# Patient Record
Sex: FEMALE | Race: WHITE | Employment: OTHER | URBAN - METROPOLITAN AREA
[De-identification: names, ages, dates, MRNs, and addresses within clinical notes are randomized per-mention and may not be internally consistent; named-entity substitution may affect disease eponyms.]

---

## 2019-08-26 ENCOUNTER — HOSPITAL ENCOUNTER (OUTPATIENT)
Dept: MRI IMAGING | Age: 84
Discharge: HOME | End: 2019-08-26
Payer: MEDICARE

## 2019-08-26 DIAGNOSIS — M54.59 MECHANICAL LOW BACK PAIN: ICD-10-CM

## 2019-08-26 PROCEDURE — 72148 MRI LUMBAR SPINE W/O DYE: CPT

## 2019-11-05 ENCOUNTER — APPOINTMENT (OUTPATIENT)
Dept: GENERAL RADIOLOGY | Age: 84
DRG: 247 | End: 2019-11-05
Payer: MEDICARE

## 2019-11-05 ENCOUNTER — HOSPITAL ENCOUNTER (INPATIENT)
Age: 84
LOS: 3 days | Discharge: HOME | DRG: 247 | End: 2019-11-08
Attending: EMERGENCY MEDICINE | Admitting: INTERNAL MEDICINE
Payer: MEDICARE

## 2019-11-05 DIAGNOSIS — I24.9 ACS (ACUTE CORONARY SYNDROME) (HCC): Primary | ICD-10-CM

## 2019-11-05 DIAGNOSIS — I21.3 ST ELEVATION MYOCARDIAL INFARCTION (STEMI), UNSPECIFIED ARTERY (HCC): ICD-10-CM

## 2019-11-05 PROBLEM — I21.11 ST ELEVATION MYOCARDIAL INFARCTION INVOLVING RIGHT CORONARY ARTERY (HCC): Status: ACTIVE | Noted: 2019-11-05

## 2019-11-05 PROBLEM — I21.11 STEMI INVOLVING RIGHT CORONARY ARTERY (HCC): Status: ACTIVE | Noted: 2019-11-05

## 2019-11-05 LAB
ANION GAP SERPL CALCULATED.3IONS-SCNC: 16 MMOL/L (ref 3–16)
BASOPHILS ABSOLUTE: 0.1 K/UL (ref 0–0.2)
BASOPHILS RELATIVE PERCENT: 0.3 %
BUN BLDV-MCNC: 15 MG/DL (ref 7–20)
CALCIUM SERPL-MCNC: 9.4 MG/DL (ref 8.3–10.6)
CHLORIDE BLD-SCNC: 98 MMOL/L (ref 99–110)
CO2: 23 MMOL/L (ref 21–32)
CREAT SERPL-MCNC: 0.8 MG/DL (ref 0.6–1.2)
EOSINOPHILS ABSOLUTE: 0 K/UL (ref 0–0.6)
EOSINOPHILS RELATIVE PERCENT: 0.1 %
GFR AFRICAN AMERICAN: >60
GFR NON-AFRICAN AMERICAN: >60
GLUCOSE BLD-MCNC: 142 MG/DL (ref 70–99)
HCT VFR BLD CALC: 42.2 % (ref 36–48)
HEMOGLOBIN: 14.1 G/DL (ref 12–16)
LEFT VENTRICULAR EJECTION FRACTION HIGH VALUE: 50 %
LEFT VENTRICULAR EJECTION FRACTION MODE: NORMAL
LEFT VENTRICULAR EJECTION FRACTION MODE: NORMAL
LV EF: 45 %
LV EF: 48 %
LV EF: 55 %
LVEF MODALITY: NORMAL
LYMPHOCYTES ABSOLUTE: 1.1 K/UL (ref 1–5.1)
LYMPHOCYTES RELATIVE PERCENT: 7.3 %
MCH RBC QN AUTO: 31 PG (ref 26–34)
MCHC RBC AUTO-ENTMCNC: 33.3 G/DL (ref 31–36)
MCV RBC AUTO: 92.9 FL (ref 80–100)
MONOCYTES ABSOLUTE: 1.1 K/UL (ref 0–1.3)
MONOCYTES RELATIVE PERCENT: 7.3 %
NEUTROPHILS ABSOLUTE: 13.1 K/UL (ref 1.7–7.7)
NEUTROPHILS RELATIVE PERCENT: 85 %
PDW BLD-RTO: 15.7 % (ref 12.4–15.4)
PLATELET # BLD: 281 K/UL (ref 135–450)
PMV BLD AUTO: 8.4 FL (ref 5–10.5)
POC ACT LR: 195 SEC
POC ACT LR: 222 SEC
POC ACT LR: 241 SEC
POC ACT LR: 247 SEC
POTASSIUM SERPL-SCNC: 3.8 MMOL/L (ref 3.5–5.1)
RBC # BLD: 4.54 M/UL (ref 4–5.2)
SODIUM BLD-SCNC: 137 MMOL/L (ref 136–145)
TROPONIN: 1.05 NG/ML
TROPONIN: 2.71 NG/ML
WBC # BLD: 15.4 K/UL (ref 4–11)

## 2019-11-05 PROCEDURE — 85347 COAGULATION TIME ACTIVATED: CPT

## 2019-11-05 PROCEDURE — 93005 ELECTROCARDIOGRAM TRACING: CPT | Performed by: INTERNAL MEDICINE

## 2019-11-05 PROCEDURE — 99223 1ST HOSP IP/OBS HIGH 75: CPT | Performed by: INTERNAL MEDICINE

## 2019-11-05 PROCEDURE — 6360000002 HC RX W HCPCS

## 2019-11-05 PROCEDURE — 93458 L HRT ARTERY/VENTRICLE ANGIO: CPT

## 2019-11-05 PROCEDURE — 6370000000 HC RX 637 (ALT 250 FOR IP): Performed by: INTERNAL MEDICINE

## 2019-11-05 PROCEDURE — B2151ZZ FLUOROSCOPY OF LEFT HEART USING LOW OSMOLAR CONTRAST: ICD-10-PCS | Performed by: INTERNAL MEDICINE

## 2019-11-05 PROCEDURE — 2709999900 HC NON-CHARGEABLE SUPPLY

## 2019-11-05 PROCEDURE — 6360000004 HC RX CONTRAST MEDICATION: Performed by: INTERNAL MEDICINE

## 2019-11-05 PROCEDURE — 027035Z DILATION OF CORONARY ARTERY, ONE ARTERY WITH TWO DRUG-ELUTING INTRALUMINAL DEVICES, PERCUTANEOUS APPROACH: ICD-10-PCS | Performed by: INTERNAL MEDICINE

## 2019-11-05 PROCEDURE — 93306 TTE W/DOPPLER COMPLETE: CPT

## 2019-11-05 PROCEDURE — C1725 CATH, TRANSLUMIN NON-LASER: HCPCS

## 2019-11-05 PROCEDURE — 99153 MOD SED SAME PHYS/QHP EA: CPT

## 2019-11-05 PROCEDURE — 6370000000 HC RX 637 (ALT 250 FOR IP): Performed by: NURSE PRACTITIONER

## 2019-11-05 PROCEDURE — 93005 ELECTROCARDIOGRAM TRACING: CPT | Performed by: EMERGENCY MEDICINE

## 2019-11-05 PROCEDURE — C1769 GUIDE WIRE: HCPCS

## 2019-11-05 PROCEDURE — 80048 BASIC METABOLIC PNL TOTAL CA: CPT

## 2019-11-05 PROCEDURE — C1894 INTRO/SHEATH, NON-LASER: HCPCS

## 2019-11-05 PROCEDURE — 71045 X-RAY EXAM CHEST 1 VIEW: CPT

## 2019-11-05 PROCEDURE — C9606 PERC D-E COR REVASC W AMI S: HCPCS

## 2019-11-05 PROCEDURE — 99152 MOD SED SAME PHYS/QHP 5/>YRS: CPT

## 2019-11-05 PROCEDURE — 99285 EMERGENCY DEPT VISIT HI MDM: CPT

## 2019-11-05 PROCEDURE — 84484 ASSAY OF TROPONIN QUANT: CPT

## 2019-11-05 PROCEDURE — B2111ZZ FLUOROSCOPY OF MULTIPLE CORONARY ARTERIES USING LOW OSMOLAR CONTRAST: ICD-10-PCS | Performed by: INTERNAL MEDICINE

## 2019-11-05 PROCEDURE — C1874 STENT, COATED/COV W/DEL SYS: HCPCS

## 2019-11-05 PROCEDURE — 94760 N-INVAS EAR/PLS OXIMETRY 1: CPT

## 2019-11-05 PROCEDURE — 2580000003 HC RX 258: Performed by: INTERNAL MEDICINE

## 2019-11-05 PROCEDURE — 6370000000 HC RX 637 (ALT 250 FOR IP)

## 2019-11-05 PROCEDURE — 2500000003 HC RX 250 WO HCPCS

## 2019-11-05 PROCEDURE — 2000000000 HC ICU R&B

## 2019-11-05 PROCEDURE — 85025 COMPLETE CBC W/AUTO DIFF WBC: CPT

## 2019-11-05 PROCEDURE — 4A023N7 MEASUREMENT OF CARDIAC SAMPLING AND PRESSURE, LEFT HEART, PERCUTANEOUS APPROACH: ICD-10-PCS | Performed by: INTERNAL MEDICINE

## 2019-11-05 RX ORDER — OXYCODONE HYDROCHLORIDE AND ACETAMINOPHEN 5; 325 MG/1; MG/1
1 TABLET ORAL EVERY 4 HOURS PRN
Status: DISCONTINUED | OUTPATIENT
Start: 2019-11-05 | End: 2019-11-08 | Stop reason: HOSPADM

## 2019-11-05 RX ORDER — ONDANSETRON 2 MG/ML
4 INJECTION INTRAMUSCULAR; INTRAVENOUS EVERY 6 HOURS PRN
Status: DISCONTINUED | OUTPATIENT
Start: 2019-11-05 | End: 2019-11-08 | Stop reason: HOSPADM

## 2019-11-05 RX ORDER — ACETAMINOPHEN 325 MG/1
650 TABLET ORAL EVERY 4 HOURS PRN
Status: DISCONTINUED | OUTPATIENT
Start: 2019-11-05 | End: 2019-11-08 | Stop reason: HOSPADM

## 2019-11-05 RX ORDER — FLUTICASONE PROPIONATE 50 MCG
1 SPRAY, SUSPENSION (ML) NASAL DAILY
Status: ON HOLD | COMMUNITY
End: 2019-11-08 | Stop reason: HOSPADM

## 2019-11-05 RX ORDER — FEXOFENADINE HYDROCHLORIDE 60 MG/1
60 TABLET, FILM COATED ORAL DAILY
COMMUNITY

## 2019-11-05 RX ORDER — ASPIRIN 81 MG/1
324 TABLET, CHEWABLE ORAL ONCE
Status: COMPLETED | OUTPATIENT
Start: 2019-11-05 | End: 2019-11-05

## 2019-11-05 RX ORDER — SODIUM CHLORIDE 0.9 % (FLUSH) 0.9 %
10 SYRINGE (ML) INJECTION EVERY 12 HOURS SCHEDULED
Status: DISCONTINUED | OUTPATIENT
Start: 2019-11-05 | End: 2019-11-08 | Stop reason: HOSPADM

## 2019-11-05 RX ORDER — SODIUM CHLORIDE 0.9 % (FLUSH) 0.9 %
10 SYRINGE (ML) INJECTION PRN
Status: DISCONTINUED | OUTPATIENT
Start: 2019-11-05 | End: 2019-11-08 | Stop reason: HOSPADM

## 2019-11-05 RX ORDER — ASPIRIN 81 MG/1
81 TABLET ORAL DAILY
Status: DISCONTINUED | OUTPATIENT
Start: 2019-11-06 | End: 2019-11-08 | Stop reason: HOSPADM

## 2019-11-05 RX ORDER — ROSUVASTATIN CALCIUM 20 MG/1
20 TABLET, COATED ORAL NIGHTLY
Status: DISCONTINUED | OUTPATIENT
Start: 2019-11-05 | End: 2019-11-08 | Stop reason: HOSPADM

## 2019-11-05 RX ORDER — SODIUM CHLORIDE 9 MG/ML
INJECTION, SOLUTION INTRAVENOUS CONTINUOUS
Status: DISCONTINUED | OUTPATIENT
Start: 2019-11-05 | End: 2019-11-06

## 2019-11-05 RX ORDER — MORPHINE SULFATE 2 MG/ML
2 INJECTION, SOLUTION INTRAMUSCULAR; INTRAVENOUS
Status: DISCONTINUED | OUTPATIENT
Start: 2019-11-05 | End: 2019-11-08 | Stop reason: HOSPADM

## 2019-11-05 RX ORDER — LEVOTHYROXINE SODIUM 0.07 MG/1
75 TABLET ORAL DAILY
COMMUNITY

## 2019-11-05 RX ORDER — CLOPIDOGREL BISULFATE 75 MG/1
75 TABLET ORAL DAILY
Status: DISCONTINUED | OUTPATIENT
Start: 2019-11-06 | End: 2019-11-08 | Stop reason: HOSPADM

## 2019-11-05 RX ORDER — OXYCODONE HYDROCHLORIDE AND ACETAMINOPHEN 5; 325 MG/1; MG/1
2 TABLET ORAL EVERY 4 HOURS PRN
Status: DISCONTINUED | OUTPATIENT
Start: 2019-11-05 | End: 2019-11-08 | Stop reason: HOSPADM

## 2019-11-05 RX ADMIN — METOPROLOL TARTRATE 25 MG: 25 TABLET, FILM COATED ORAL at 13:51

## 2019-11-05 RX ADMIN — SODIUM CHLORIDE: 9 INJECTION, SOLUTION INTRAVENOUS at 13:51

## 2019-11-05 RX ADMIN — ACETAMINOPHEN 650 MG: 325 TABLET, FILM COATED ORAL at 17:17

## 2019-11-05 RX ADMIN — ROSUVASTATIN CALCIUM 20 MG: 20 TABLET, FILM COATED ORAL at 21:04

## 2019-11-05 RX ADMIN — IOPAMIDOL 105 ML: 755 INJECTION, SOLUTION INTRAVENOUS at 13:16

## 2019-11-05 RX ADMIN — ASPIRIN 81 MG 243 MG: 81 TABLET ORAL at 11:36

## 2019-11-05 ASSESSMENT — ENCOUNTER SYMPTOMS
ABDOMINAL PAIN: 0
SHORTNESS OF BREATH: 1
NAUSEA: 0

## 2019-11-05 ASSESSMENT — PAIN SCALES - GENERAL
PAINLEVEL_OUTOF10: 3
PAINLEVEL_OUTOF10: 10
PAINLEVEL_OUTOF10: 3
PAINLEVEL_OUTOF10: 0

## 2019-11-05 ASSESSMENT — PAIN DESCRIPTION - PAIN TYPE
TYPE: ACUTE PAIN
TYPE: ACUTE PAIN

## 2019-11-05 ASSESSMENT — PAIN DESCRIPTION - LOCATION
LOCATION: CHEST
LOCATION: CHEST

## 2019-11-06 LAB
ANION GAP SERPL CALCULATED.3IONS-SCNC: 12 MMOL/L (ref 3–16)
BUN BLDV-MCNC: 17 MG/DL (ref 7–20)
CALCIUM SERPL-MCNC: 8.4 MG/DL (ref 8.3–10.6)
CHLORIDE BLD-SCNC: 105 MMOL/L (ref 99–110)
CHOLESTEROL, TOTAL: 152 MG/DL (ref 0–199)
CO2: 23 MMOL/L (ref 21–32)
CREAT SERPL-MCNC: 0.7 MG/DL (ref 0.6–1.2)
EKG ATRIAL RATE: 101 BPM
EKG ATRIAL RATE: 80 BPM
EKG DIAGNOSIS: NORMAL
EKG DIAGNOSIS: NORMAL
EKG P AXIS: 57 DEGREES
EKG P AXIS: 70 DEGREES
EKG P-R INTERVAL: 124 MS
EKG P-R INTERVAL: 128 MS
EKG Q-T INTERVAL: 342 MS
EKG Q-T INTERVAL: 392 MS
EKG QRS DURATION: 86 MS
EKG QRS DURATION: 90 MS
EKG QTC CALCULATION (BAZETT): 443 MS
EKG QTC CALCULATION (BAZETT): 452 MS
EKG R AXIS: -30 DEGREES
EKG R AXIS: -42 DEGREES
EKG T AXIS: -34 DEGREES
EKG T AXIS: 7 DEGREES
EKG VENTRICULAR RATE: 101 BPM
EKG VENTRICULAR RATE: 80 BPM
GFR AFRICAN AMERICAN: >60
GFR NON-AFRICAN AMERICAN: >60
GLUCOSE BLD-MCNC: 146 MG/DL (ref 70–99)
HCT VFR BLD CALC: 36.3 % (ref 36–48)
HDLC SERPL-MCNC: 67 MG/DL (ref 40–60)
HEMOGLOBIN: 12.1 G/DL (ref 12–16)
LDL CHOLESTEROL CALCULATED: 71 MG/DL
MCH RBC QN AUTO: 31.2 PG (ref 26–34)
MCHC RBC AUTO-ENTMCNC: 33.2 G/DL (ref 31–36)
MCV RBC AUTO: 93.9 FL (ref 80–100)
PDW BLD-RTO: 15.5 % (ref 12.4–15.4)
PLATELET # BLD: 222 K/UL (ref 135–450)
PMV BLD AUTO: 8.6 FL (ref 5–10.5)
POTASSIUM SERPL-SCNC: 3.3 MMOL/L (ref 3.5–5.1)
RBC # BLD: 3.87 M/UL (ref 4–5.2)
SODIUM BLD-SCNC: 140 MMOL/L (ref 136–145)
TRIGL SERPL-MCNC: 68 MG/DL (ref 0–150)
VLDLC SERPL CALC-MCNC: 14 MG/DL
WBC # BLD: 10.3 K/UL (ref 4–11)

## 2019-11-06 PROCEDURE — 80061 LIPID PANEL: CPT

## 2019-11-06 PROCEDURE — 94760 N-INVAS EAR/PLS OXIMETRY 1: CPT

## 2019-11-06 PROCEDURE — 6370000000 HC RX 637 (ALT 250 FOR IP): Performed by: INTERNAL MEDICINE

## 2019-11-06 PROCEDURE — 85027 COMPLETE CBC AUTOMATED: CPT

## 2019-11-06 PROCEDURE — 93010 ELECTROCARDIOGRAM REPORT: CPT | Performed by: INTERNAL MEDICINE

## 2019-11-06 PROCEDURE — 80048 BASIC METABOLIC PNL TOTAL CA: CPT

## 2019-11-06 PROCEDURE — 99291 CRITICAL CARE FIRST HOUR: CPT | Performed by: INTERNAL MEDICINE

## 2019-11-06 PROCEDURE — 2580000003 HC RX 258: Performed by: INTERNAL MEDICINE

## 2019-11-06 PROCEDURE — 2000000000 HC ICU R&B

## 2019-11-06 RX ORDER — POTASSIUM CHLORIDE 20 MEQ/1
40 TABLET, EXTENDED RELEASE ORAL PRN
Status: DISCONTINUED | OUTPATIENT
Start: 2019-11-06 | End: 2019-11-08 | Stop reason: HOSPADM

## 2019-11-06 RX ORDER — METOPROLOL SUCCINATE 25 MG/1
12.5 TABLET, EXTENDED RELEASE ORAL DAILY
Status: DISCONTINUED | OUTPATIENT
Start: 2019-11-07 | End: 2019-11-08 | Stop reason: HOSPADM

## 2019-11-06 RX ORDER — POTASSIUM CHLORIDE 7.45 MG/ML
10 INJECTION INTRAVENOUS PRN
Status: DISCONTINUED | OUTPATIENT
Start: 2019-11-06 | End: 2019-11-08 | Stop reason: HOSPADM

## 2019-11-06 RX ORDER — LEVOTHYROXINE SODIUM 0.07 MG/1
75 TABLET ORAL DAILY
Status: DISCONTINUED | OUTPATIENT
Start: 2019-11-06 | End: 2019-11-08 | Stop reason: HOSPADM

## 2019-11-06 RX ORDER — LOSARTAN POTASSIUM 25 MG/1
25 TABLET ORAL DAILY
Status: DISCONTINUED | OUTPATIENT
Start: 2019-11-06 | End: 2019-11-08 | Stop reason: HOSPADM

## 2019-11-06 RX ADMIN — SODIUM CHLORIDE: 9 INJECTION, SOLUTION INTRAVENOUS at 06:04

## 2019-11-06 RX ADMIN — CLOPIDOGREL 75 MG: 75 TABLET, FILM COATED ORAL at 08:50

## 2019-11-06 RX ADMIN — OXYCODONE HYDROCHLORIDE AND ACETAMINOPHEN 1 TABLET: 5; 325 TABLET ORAL at 03:01

## 2019-11-06 RX ADMIN — ASPIRIN 81 MG: 81 TABLET, COATED ORAL at 08:50

## 2019-11-06 RX ADMIN — POTASSIUM CHLORIDE 40 MEQ: 1500 TABLET, EXTENDED RELEASE ORAL at 17:23

## 2019-11-06 RX ADMIN — ROSUVASTATIN CALCIUM 20 MG: 20 TABLET, FILM COATED ORAL at 20:47

## 2019-11-06 RX ADMIN — Medication 10 ML: at 08:50

## 2019-11-06 RX ADMIN — Medication 10 ML: at 20:47

## 2019-11-06 RX ADMIN — LOSARTAN POTASSIUM 25 MG: 25 TABLET ORAL at 10:26

## 2019-11-06 RX ADMIN — LEVOTHYROXINE SODIUM 75 MCG: 75 TABLET ORAL at 10:26

## 2019-11-06 ASSESSMENT — PAIN SCALES - GENERAL
PAINLEVEL_OUTOF10: 0
PAINLEVEL_OUTOF10: 0
PAINLEVEL_OUTOF10: 4
PAINLEVEL_OUTOF10: 0
PAINLEVEL_OUTOF10: 2
PAINLEVEL_OUTOF10: 2
PAINLEVEL_OUTOF10: 0

## 2019-11-06 ASSESSMENT — PAIN DESCRIPTION - LOCATION
LOCATION: CHEST

## 2019-11-06 ASSESSMENT — PAIN DESCRIPTION - PAIN TYPE
TYPE: ACUTE PAIN

## 2019-11-07 LAB
ANION GAP SERPL CALCULATED.3IONS-SCNC: 13 MMOL/L (ref 3–16)
BUN BLDV-MCNC: 23 MG/DL (ref 7–20)
CALCIUM SERPL-MCNC: 9.7 MG/DL (ref 8.3–10.6)
CHLORIDE BLD-SCNC: 103 MMOL/L (ref 99–110)
CO2: 23 MMOL/L (ref 21–32)
CREAT SERPL-MCNC: 1 MG/DL (ref 0.6–1.2)
GFR AFRICAN AMERICAN: >60
GFR NON-AFRICAN AMERICAN: 52
GLUCOSE BLD-MCNC: 212 MG/DL (ref 70–99)
HCT VFR BLD CALC: 37.4 % (ref 36–48)
HEMOGLOBIN: 12.4 G/DL (ref 12–16)
MAGNESIUM: 2 MG/DL (ref 1.8–2.4)
MCH RBC QN AUTO: 30.9 PG (ref 26–34)
MCHC RBC AUTO-ENTMCNC: 33.2 G/DL (ref 31–36)
MCV RBC AUTO: 93.1 FL (ref 80–100)
PDW BLD-RTO: 15.7 % (ref 12.4–15.4)
PLATELET # BLD: 281 K/UL (ref 135–450)
PMV BLD AUTO: 8.5 FL (ref 5–10.5)
POTASSIUM SERPL-SCNC: 4.3 MMOL/L (ref 3.5–5.1)
RBC # BLD: 4.02 M/UL (ref 4–5.2)
SODIUM BLD-SCNC: 139 MMOL/L (ref 136–145)
WBC # BLD: 9.5 K/UL (ref 4–11)

## 2019-11-07 PROCEDURE — 6370000000 HC RX 637 (ALT 250 FOR IP): Performed by: INTERNAL MEDICINE

## 2019-11-07 PROCEDURE — 83735 ASSAY OF MAGNESIUM: CPT

## 2019-11-07 PROCEDURE — 94760 N-INVAS EAR/PLS OXIMETRY 1: CPT

## 2019-11-07 PROCEDURE — 85027 COMPLETE CBC AUTOMATED: CPT

## 2019-11-07 PROCEDURE — 2000000000 HC ICU R&B

## 2019-11-07 PROCEDURE — 2580000003 HC RX 258: Performed by: INTERNAL MEDICINE

## 2019-11-07 PROCEDURE — 80048 BASIC METABOLIC PNL TOTAL CA: CPT

## 2019-11-07 PROCEDURE — 36415 COLL VENOUS BLD VENIPUNCTURE: CPT

## 2019-11-07 PROCEDURE — 99233 SBSQ HOSP IP/OBS HIGH 50: CPT | Performed by: INTERNAL MEDICINE

## 2019-11-07 RX ADMIN — METOPROLOL SUCCINATE 12.5 MG: 25 TABLET, FILM COATED, EXTENDED RELEASE ORAL at 08:39

## 2019-11-07 RX ADMIN — CLOPIDOGREL 75 MG: 75 TABLET, FILM COATED ORAL at 08:39

## 2019-11-07 RX ADMIN — ROSUVASTATIN CALCIUM 20 MG: 20 TABLET, FILM COATED ORAL at 21:12

## 2019-11-07 RX ADMIN — OXYCODONE HYDROCHLORIDE AND ACETAMINOPHEN 1 TABLET: 5; 325 TABLET ORAL at 12:08

## 2019-11-07 RX ADMIN — LEVOTHYROXINE SODIUM 75 MCG: 75 TABLET ORAL at 06:31

## 2019-11-07 RX ADMIN — Medication 10 ML: at 08:40

## 2019-11-07 RX ADMIN — ASPIRIN 81 MG: 81 TABLET, COATED ORAL at 08:39

## 2019-11-07 RX ADMIN — OXYCODONE HYDROCHLORIDE AND ACETAMINOPHEN 1 TABLET: 5; 325 TABLET ORAL at 21:12

## 2019-11-07 RX ADMIN — LOSARTAN POTASSIUM 25 MG: 25 TABLET ORAL at 08:39

## 2019-11-07 ASSESSMENT — PAIN DESCRIPTION - PAIN TYPE
TYPE: CHRONIC PAIN
TYPE: ACUTE PAIN
TYPE: CHRONIC PAIN
TYPE: CHRONIC PAIN

## 2019-11-07 ASSESSMENT — PAIN DESCRIPTION - ORIENTATION
ORIENTATION: LOWER

## 2019-11-07 ASSESSMENT — PAIN SCALES - GENERAL
PAINLEVEL_OUTOF10: 1
PAINLEVEL_OUTOF10: 1
PAINLEVEL_OUTOF10: 0
PAINLEVEL_OUTOF10: 4
PAINLEVEL_OUTOF10: 6
PAINLEVEL_OUTOF10: 2
PAINLEVEL_OUTOF10: 2
PAINLEVEL_OUTOF10: 0

## 2019-11-07 ASSESSMENT — PAIN DESCRIPTION - DESCRIPTORS: DESCRIPTORS: ACHING

## 2019-11-07 ASSESSMENT — PAIN DESCRIPTION - LOCATION
LOCATION: CHEST
LOCATION: BACK

## 2019-11-07 ASSESSMENT — PAIN DESCRIPTION - PROGRESSION
CLINICAL_PROGRESSION: GRADUALLY WORSENING

## 2019-11-07 ASSESSMENT — PAIN DESCRIPTION - ONSET: ONSET: GRADUAL

## 2019-11-07 ASSESSMENT — PAIN DESCRIPTION - FREQUENCY: FREQUENCY: CONTINUOUS

## 2019-11-08 VITALS
RESPIRATION RATE: 16 BRPM | HEIGHT: 61 IN | SYSTOLIC BLOOD PRESSURE: 128 MMHG | DIASTOLIC BLOOD PRESSURE: 66 MMHG | HEART RATE: 93 BPM | WEIGHT: 118.61 LBS | OXYGEN SATURATION: 97 % | TEMPERATURE: 97.8 F | BODY MASS INDEX: 22.39 KG/M2

## 2019-11-08 PROCEDURE — 94760 N-INVAS EAR/PLS OXIMETRY 1: CPT

## 2019-11-08 PROCEDURE — 2580000003 HC RX 258: Performed by: INTERNAL MEDICINE

## 2019-11-08 PROCEDURE — 99239 HOSP IP/OBS DSCHRG MGMT >30: CPT | Performed by: INTERNAL MEDICINE

## 2019-11-08 PROCEDURE — 99223 1ST HOSP IP/OBS HIGH 75: CPT | Performed by: INTERNAL MEDICINE

## 2019-11-08 PROCEDURE — 6370000000 HC RX 637 (ALT 250 FOR IP): Performed by: INTERNAL MEDICINE

## 2019-11-08 RX ORDER — METOPROLOL SUCCINATE 25 MG/1
12.5 TABLET, EXTENDED RELEASE ORAL DAILY
Qty: 30 TABLET | Refills: 3 | Status: SHIPPED | OUTPATIENT
Start: 2019-11-09 | End: 2020-07-01 | Stop reason: SDUPTHER

## 2019-11-08 RX ORDER — ROSUVASTATIN CALCIUM 20 MG/1
20 TABLET, COATED ORAL NIGHTLY
Qty: 90 TABLET | Refills: 3 | Status: SHIPPED | OUTPATIENT
Start: 2019-11-08 | End: 2020-11-03 | Stop reason: SDUPTHER

## 2019-11-08 RX ORDER — LOSARTAN POTASSIUM 25 MG/1
25 TABLET ORAL DAILY
Qty: 30 TABLET | Refills: 3 | Status: SHIPPED | OUTPATIENT
Start: 2019-11-09 | End: 2020-01-21 | Stop reason: DRUGHIGH

## 2019-11-08 RX ORDER — CLOPIDOGREL BISULFATE 75 MG/1
75 TABLET ORAL DAILY
Qty: 90 TABLET | Refills: 3 | Status: SHIPPED | OUTPATIENT
Start: 2019-11-09 | End: 2020-10-30 | Stop reason: ALTCHOICE

## 2019-11-08 RX ORDER — PANTOPRAZOLE SODIUM 20 MG/1
20 TABLET, DELAYED RELEASE ORAL
Qty: 90 TABLET | Refills: 1 | Status: SHIPPED | OUTPATIENT
Start: 2019-11-08 | End: 2020-08-03

## 2019-11-08 RX ORDER — ASPIRIN 81 MG/1
81 TABLET ORAL DAILY
Qty: 30 TABLET | Refills: 3 | Status: SHIPPED | OUTPATIENT
Start: 2019-11-09

## 2019-11-08 RX ADMIN — Medication 10 ML: at 08:34

## 2019-11-08 RX ADMIN — OXYCODONE HYDROCHLORIDE AND ACETAMINOPHEN 1 TABLET: 5; 325 TABLET ORAL at 11:46

## 2019-11-08 RX ADMIN — CLOPIDOGREL 75 MG: 75 TABLET, FILM COATED ORAL at 08:32

## 2019-11-08 RX ADMIN — LOSARTAN POTASSIUM 25 MG: 25 TABLET ORAL at 11:46

## 2019-11-08 RX ADMIN — ASPIRIN 81 MG: 81 TABLET, COATED ORAL at 08:32

## 2019-11-08 RX ADMIN — METOPROLOL SUCCINATE 12.5 MG: 25 TABLET, FILM COATED, EXTENDED RELEASE ORAL at 08:33

## 2019-11-08 RX ADMIN — LEVOTHYROXINE SODIUM 75 MCG: 75 TABLET ORAL at 07:30

## 2019-11-08 ASSESSMENT — PAIN DESCRIPTION - PAIN TYPE: TYPE: CHRONIC PAIN

## 2019-11-08 ASSESSMENT — PAIN DESCRIPTION - ORIENTATION: ORIENTATION: LOWER

## 2019-11-08 ASSESSMENT — PAIN SCALES - GENERAL
PAINLEVEL_OUTOF10: 0
PAINLEVEL_OUTOF10: 8

## 2019-11-08 ASSESSMENT — PAIN DESCRIPTION - LOCATION: LOCATION: BACK

## 2019-11-13 ENCOUNTER — HOSPITAL ENCOUNTER (OUTPATIENT)
Age: 84
Discharge: HOME | End: 2019-11-13
Payer: MEDICARE

## 2019-11-13 ENCOUNTER — OFFICE VISIT (OUTPATIENT)
Dept: CARDIOLOGY CLINIC | Age: 84
End: 2019-11-13
Payer: MEDICARE

## 2019-11-13 VITALS
OXYGEN SATURATION: 97 % | HEIGHT: 61 IN | DIASTOLIC BLOOD PRESSURE: 58 MMHG | WEIGHT: 122 LBS | BODY MASS INDEX: 23.03 KG/M2 | SYSTOLIC BLOOD PRESSURE: 118 MMHG | HEART RATE: 76 BPM

## 2019-11-13 DIAGNOSIS — I50.22 CHRONIC SYSTOLIC HEART FAILURE (HCC): Primary | ICD-10-CM

## 2019-11-13 DIAGNOSIS — I25.10 CORONARY ARTERY DISEASE INVOLVING NATIVE CORONARY ARTERY OF NATIVE HEART WITHOUT ANGINA PECTORIS: ICD-10-CM

## 2019-11-13 DIAGNOSIS — I25.5 ISCHEMIC CARDIOMYOPATHY: ICD-10-CM

## 2019-11-13 DIAGNOSIS — I50.22 CHRONIC SYSTOLIC HEART FAILURE (HCC): ICD-10-CM

## 2019-11-13 LAB
ANION GAP SERPL CALCULATED.3IONS-SCNC: 13 MMOL/L (ref 3–16)
BUN BLDV-MCNC: 19 MG/DL (ref 7–20)
CALCIUM SERPL-MCNC: 10.3 MG/DL (ref 8.3–10.6)
CHLORIDE BLD-SCNC: 102 MMOL/L (ref 99–110)
CO2: 25 MMOL/L (ref 21–32)
CREAT SERPL-MCNC: 0.9 MG/DL (ref 0.6–1.2)
GFR AFRICAN AMERICAN: >60
GFR NON-AFRICAN AMERICAN: 59
GLUCOSE BLD-MCNC: 102 MG/DL (ref 70–99)
POTASSIUM SERPL-SCNC: 5.5 MMOL/L (ref 3.5–5.1)
PRO-BNP: 2005 PG/ML (ref 0–449)
SODIUM BLD-SCNC: 140 MMOL/L (ref 136–145)

## 2019-11-13 PROCEDURE — 83880 ASSAY OF NATRIURETIC PEPTIDE: CPT

## 2019-11-13 PROCEDURE — 99214 OFFICE O/P EST MOD 30 MIN: CPT | Performed by: CLINICAL NURSE SPECIALIST

## 2019-11-13 PROCEDURE — 80048 BASIC METABOLIC PNL TOTAL CA: CPT

## 2019-11-13 PROCEDURE — 1090F PRES/ABSN URINE INCON ASSESS: CPT | Performed by: CLINICAL NURSE SPECIALIST

## 2019-11-13 PROCEDURE — G8420 CALC BMI NORM PARAMETERS: HCPCS | Performed by: CLINICAL NURSE SPECIALIST

## 2019-11-13 PROCEDURE — G8427 DOCREV CUR MEDS BY ELIG CLIN: HCPCS | Performed by: CLINICAL NURSE SPECIALIST

## 2019-11-13 PROCEDURE — 1123F ACP DISCUSS/DSCN MKR DOCD: CPT | Performed by: CLINICAL NURSE SPECIALIST

## 2019-11-13 PROCEDURE — 1111F DSCHRG MED/CURRENT MED MERGE: CPT | Performed by: CLINICAL NURSE SPECIALIST

## 2019-11-13 PROCEDURE — 1036F TOBACCO NON-USER: CPT | Performed by: CLINICAL NURSE SPECIALIST

## 2019-11-13 PROCEDURE — G8484 FLU IMMUNIZE NO ADMIN: HCPCS | Performed by: CLINICAL NURSE SPECIALIST

## 2019-11-13 PROCEDURE — 4040F PNEUMOC VAC/ADMIN/RCVD: CPT | Performed by: CLINICAL NURSE SPECIALIST

## 2019-11-13 PROCEDURE — 36415 COLL VENOUS BLD VENIPUNCTURE: CPT

## 2019-11-13 PROCEDURE — G8598 ASA/ANTIPLAT THER USED: HCPCS | Performed by: CLINICAL NURSE SPECIALIST

## 2019-11-13 RX ORDER — MV-MIN/FA/VIT K/LUTEIN/ZEAXANT 200MCG-5MG
2 CAPSULE ORAL DAILY
COMMUNITY

## 2019-11-13 RX ORDER — CAL/D3/MAG11/ZINC/COP/MANG/BOR 600 MG-800
2 TABLET ORAL DAILY
COMMUNITY

## 2019-11-14 ENCOUNTER — TELEPHONE (OUTPATIENT)
Dept: CARDIOLOGY CLINIC | Age: 84
End: 2019-11-14

## 2019-11-14 DIAGNOSIS — I50.22 CHRONIC SYSTOLIC HEART FAILURE (HCC): Primary | ICD-10-CM

## 2019-11-19 ENCOUNTER — HOSPITAL ENCOUNTER (OUTPATIENT)
Age: 84
Discharge: HOME | End: 2019-11-19
Payer: MEDICARE

## 2019-11-19 ENCOUNTER — TELEPHONE (OUTPATIENT)
Dept: CARDIOLOGY CLINIC | Age: 84
End: 2019-11-19

## 2019-11-19 DIAGNOSIS — I50.22 CHRONIC SYSTOLIC HEART FAILURE (HCC): ICD-10-CM

## 2019-11-19 LAB
ANION GAP SERPL CALCULATED.3IONS-SCNC: 15 MMOL/L (ref 3–16)
BUN BLDV-MCNC: 17 MG/DL (ref 7–20)
CALCIUM SERPL-MCNC: 9.6 MG/DL (ref 8.3–10.6)
CHLORIDE BLD-SCNC: 107 MMOL/L (ref 99–110)
CO2: 23 MMOL/L (ref 21–32)
CREAT SERPL-MCNC: 0.8 MG/DL (ref 0.6–1.2)
GFR AFRICAN AMERICAN: >60
GFR NON-AFRICAN AMERICAN: >60
GLUCOSE BLD-MCNC: 94 MG/DL (ref 70–99)
POTASSIUM SERPL-SCNC: 3.5 MMOL/L (ref 3.5–5.1)
PRO-BNP: 1512 PG/ML (ref 0–449)
SODIUM BLD-SCNC: 145 MMOL/L (ref 136–145)

## 2019-11-19 PROCEDURE — 80048 BASIC METABOLIC PNL TOTAL CA: CPT

## 2019-11-19 PROCEDURE — 36415 COLL VENOUS BLD VENIPUNCTURE: CPT

## 2019-11-19 PROCEDURE — 83880 ASSAY OF NATRIURETIC PEPTIDE: CPT

## 2019-12-11 ENCOUNTER — OFFICE VISIT (OUTPATIENT)
Dept: CARDIOLOGY CLINIC | Age: 84
End: 2019-12-11
Payer: MEDICARE

## 2019-12-11 VITALS
SYSTOLIC BLOOD PRESSURE: 110 MMHG | WEIGHT: 124 LBS | HEART RATE: 75 BPM | HEIGHT: 61 IN | OXYGEN SATURATION: 98 % | DIASTOLIC BLOOD PRESSURE: 60 MMHG | BODY MASS INDEX: 23.41 KG/M2

## 2019-12-11 DIAGNOSIS — I25.5 CARDIOMYOPATHY, ISCHEMIC: ICD-10-CM

## 2019-12-11 DIAGNOSIS — E78.49 OTHER HYPERLIPIDEMIA: ICD-10-CM

## 2019-12-11 DIAGNOSIS — I25.10 CORONARY ARTERY DISEASE INVOLVING NATIVE CORONARY ARTERY OF NATIVE HEART WITHOUT ANGINA PECTORIS: Primary | ICD-10-CM

## 2019-12-11 DIAGNOSIS — I50.21 ACUTE SYSTOLIC (CONGESTIVE) HEART FAILURE (HCC): ICD-10-CM

## 2019-12-11 PROCEDURE — 99214 OFFICE O/P EST MOD 30 MIN: CPT | Performed by: INTERNAL MEDICINE

## 2020-01-21 ENCOUNTER — OFFICE VISIT (OUTPATIENT)
Dept: CARDIOLOGY CLINIC | Age: 85
End: 2020-01-21
Payer: MEDICARE

## 2020-01-21 ENCOUNTER — TELEPHONE (OUTPATIENT)
Dept: CARDIOLOGY CLINIC | Age: 85
End: 2020-01-21

## 2020-01-21 VITALS
OXYGEN SATURATION: 92 % | SYSTOLIC BLOOD PRESSURE: 114 MMHG | HEIGHT: 61 IN | WEIGHT: 125 LBS | HEART RATE: 73 BPM | DIASTOLIC BLOOD PRESSURE: 48 MMHG | BODY MASS INDEX: 23.6 KG/M2

## 2020-01-21 PROCEDURE — G8420 CALC BMI NORM PARAMETERS: HCPCS | Performed by: CLINICAL NURSE SPECIALIST

## 2020-01-21 PROCEDURE — 1090F PRES/ABSN URINE INCON ASSESS: CPT | Performed by: CLINICAL NURSE SPECIALIST

## 2020-01-21 PROCEDURE — G8484 FLU IMMUNIZE NO ADMIN: HCPCS | Performed by: CLINICAL NURSE SPECIALIST

## 2020-01-21 PROCEDURE — 1123F ACP DISCUSS/DSCN MKR DOCD: CPT | Performed by: CLINICAL NURSE SPECIALIST

## 2020-01-21 PROCEDURE — G8427 DOCREV CUR MEDS BY ELIG CLIN: HCPCS | Performed by: CLINICAL NURSE SPECIALIST

## 2020-01-21 PROCEDURE — 4040F PNEUMOC VAC/ADMIN/RCVD: CPT | Performed by: CLINICAL NURSE SPECIALIST

## 2020-01-21 PROCEDURE — 99214 OFFICE O/P EST MOD 30 MIN: CPT | Performed by: CLINICAL NURSE SPECIALIST

## 2020-01-21 PROCEDURE — 1036F TOBACCO NON-USER: CPT | Performed by: CLINICAL NURSE SPECIALIST

## 2020-01-21 RX ORDER — ASCORBIC ACID 500 MG
500 TABLET ORAL DAILY
COMMUNITY

## 2020-01-21 RX ORDER — HYDROCODONE BITARTRATE AND ACETAMINOPHEN 5; 325 MG/1; MG/1
1 TABLET ORAL DAILY PRN
COMMUNITY
Start: 2020-01-02 | End: 2020-12-14 | Stop reason: ALTCHOICE

## 2020-01-21 RX ORDER — LOSARTAN POTASSIUM 25 MG/1
12.5 TABLET ORAL DAILY
Qty: 30 TABLET | Refills: 0 | Status: SHIPPED
Start: 2020-01-21 | End: 2020-06-03 | Stop reason: SDUPTHER

## 2020-01-21 NOTE — TELEPHONE ENCOUNTER
----- Message from DUKE Emery - CNS sent at 1/21/2020  2:41 PM EST -----  Please call lab and see if they can add vitamin d to labs done this morning  Thanks  rg

## 2020-01-21 NOTE — PROGRESS NOTES
[address]  Progress Note    Primary Care Doctor:  Gerson Cerda MD    Chief Complaint   Patient presents with   Talya Davison     Denies cardiac symptoms        History of Present Illness:  80 y.o. female with history of arthritis and thyroid  11/5-8/19 for ACS with new sHF and LHC with PCI to the mid proximal to mid RCA. I had the pleasure of seeing Rob Coulter in follow up for sHF. She is ambulatory by her self today. She states that she does not feel well but can not give me specifics. Her weight is stable, no edema, sob or palpitations. She can not do cardiac rehab due to her back and knee pain. She does not check her BP at home and denies any dizziness. Past Medical History:   has a past medical history of Acute systolic (congestive) heart failure ([address] Arthritis, Coronary artery disease involving native coronary artery of native heart without angina pectoris, and Thyroid disease. Surgical History:   has a past surgical history that includes back surgery; Hysterectomy; and Appendectomy. Social History:   reports that she has never smoked. She has never used smokeless tobacco. She reports previous alcohol use. She reports that she does not use drugs. Family History:   History reviewed. No pertinent family history. Home Medications:  Prior to Admission medications    Medication Sig Start Date End Date Taking? Authorizing Provider   HYDROcodone-acetaminophen (NORCO) 5-325 MG per tablet Take 1 tablet by mouth daily as needed.  1/2/20  Yes Historical Provider, MD   vitamin C (ASCORBIC ACID) 500 MG tablet Take 500 mg by mouth daily   Yes Historical Provider, MD   losartan (COZAAR) 25 MG tablet Take 0.5 tablets by mouth daily 1/21/20  Yes DUKE Vazquez - CNS   Multiple Vitamins-Minerals (PRESERVISION AREDS 2+MULTI VIT) CAPS Take 2 capsules by mouth daily   Yes Historical Provider, MD   CALTRATE 600+D PLUS MINERALS (CALTRATE) 600-800 MG-UNIT TABS tablet Take 2 tablets by lymph nodes. · Allergic/Immunologic: No nasal congestion or hives. Physical Examination:    Vitals:    01/21/20 1033   BP: (!) 114/48   Site: Left Upper Arm   Position: Sitting   Cuff Size: Medium Adult   Pulse: 73   SpO2: 92%   Weight: 125 lb (56.7 kg)   Height: 5' 1\" (1.549 m)        Constitutional and General Appearance: Warm and dry, no apparent distress, normal coloration  HEENT:  Normocephalic, atraumatic  Respiratory:  · Normal excursion and expansion without use of accessory muscles  · Resp Auscultation: Normal breath sounds without dullness  Cardiovascular:  · The apical impulses not displaced  · Heart tones are crisp and normal  · JVP normal cm H2O  · Regular rate and rhythm  · Peripheral pulses are symmetrical and full  · There is no clubbing, cyanosis of the extremities.   · no edema  · Pedal Pulses: 2+ and equal   Abdomen:  · No masses or tenderness  · Liver/Spleen: No Abnormalities Noted  Neurological/Psychiatric:  · Alert and oriented in all spheres  · Moves all extremities well  · Exhibits normal gait balance and coordination  · No abnormalities of mood, affect, memory, mentation, or behavior are noted    Lab Data:    CBC:   Lab Results   Component Value Date    WBC 9.5 11/07/2019    WBC 10.3 11/06/2019    WBC 15.4 11/05/2019    RBC 4.02 11/07/2019    RBC 3.87 11/06/2019    RBC 4.54 11/05/2019    HGB 12.4 11/07/2019    HGB 12.1 11/06/2019    HGB 14.1 11/05/2019    HCT 37.4 11/07/2019    HCT 36.3 11/06/2019    HCT 42.2 11/05/2019    MCV 93.1 11/07/2019    MCV 93.9 11/06/2019    MCV 92.9 11/05/2019    RDW 15.7 11/07/2019    RDW 15.5 11/06/2019    RDW 15.7 11/05/2019     11/07/2019     11/06/2019     11/05/2019     BMP:  Lab Results   Component Value Date     11/19/2019     11/13/2019     11/07/2019    K 3.5 11/19/2019    K 5.5 11/13/2019    K 4.3 11/07/2019     11/19/2019     11/13/2019     11/07/2019    CO2 23 11/19/2019    CO2 25 11/13/2019 CO2 23 11/07/2019    BUN 17 11/19/2019    BUN 19 11/13/2019    BUN 23 11/07/2019    CREATININE 0.8 11/19/2019    CREATININE 0.9 11/13/2019    CREATININE 1.0 11/07/2019     BNP:   Lab Results   Component Value Date    PROBNP 1,512 11/19/2019    PROBNP 2,005 11/13/2019     Cardiac Imaging:    Cardiac Cath:   11/5/19:  Cardiac Cath LVG, PCI:  Anatomy:   LM-normal no disease   LAD-Proximal 50%  Cx-normal no disease   OM- normal no disease   Ramus-normal no disease   RCA- Dominant, proximal 100% thrombotic occlusion  RPDA- normal no disease   LVEF- 60%  LVG- basal inferior hypokinesis  LVEDP- 5     Echo;  11/5/19:   -There is mild left ventricular systolic dysfunction with inferior   hypokinesis and EF=45-50%. -The right ventricle appears dilated and hypokinetic. TAPSE is estimated at   1.16 cm.   -Aortic valve appears sclerotic but opens adequately. Trivial aortic   regurgitation.   -Mild mitral annular calcification noted. -Trivial mitral regurgitation.   -There is at least moderate tricuspid regurgitation with a RVSP estimation   of 35 mmHg. -The IVC is dilated . -Grade 1 diastolic dysfunction. Avg. E/e'=7.75   -A bubble study was performed and fails to show evidence of shunting. Assessment:    1. Chronic systolic heart failure (HCC) on arb and bb; no aldosterone due to elevated potassium   2. Coronary artery disease involving native coronary artery of native heart without angina pectoris    3. Ischemic cardiomyopathy    4. Hypovitaminosis D    Plan:   Patient Instructions   1. Take metoprolol in the evening and losartan in the morning  2. Continue all current medications  3. If no improvement, with above change will change losartan to valsartan  4. RTO in 3 months  5. Blood work in Feb    Will add vitamin D to labs done this morning    I appreciate the opportunity of cooperating in the care of this individual.    PATEL Huitron, 1/21/2020, 2:40 PM    QUALITY MEASURES  1.  Tobacco

## 2020-02-17 ENCOUNTER — HOSPITAL ENCOUNTER (OUTPATIENT)
Dept: NON INVASIVE DIAGNOSTICS | Age: 85
Discharge: HOME | End: 2020-02-17
Payer: MEDICARE

## 2020-02-17 LAB
LEFT VENTRICULAR EJECTION FRACTION MODE: NORMAL
LV EF: 55 %
LV EF: 55 %
LVEF MODALITY: NORMAL

## 2020-02-17 PROCEDURE — 93306 TTE W/DOPPLER COMPLETE: CPT

## 2020-02-20 ENCOUNTER — HOSPITAL ENCOUNTER (OUTPATIENT)
Age: 85
Discharge: HOME | End: 2020-02-20
Payer: MEDICARE

## 2020-02-20 ENCOUNTER — TELEPHONE (OUTPATIENT)
Dept: CARDIOLOGY CLINIC | Age: 85
End: 2020-02-20

## 2020-02-20 LAB
ALT SERPL-CCNC: 10 U/L (ref 10–40)
ANION GAP SERPL CALCULATED.3IONS-SCNC: 12 MMOL/L (ref 3–16)
AST SERPL-CCNC: 17 U/L (ref 15–37)
BUN BLDV-MCNC: 23 MG/DL (ref 7–20)
CALCIUM SERPL-MCNC: 9.9 MG/DL (ref 8.3–10.6)
CHLORIDE BLD-SCNC: 107 MMOL/L (ref 99–110)
CHOLESTEROL, TOTAL: 101 MG/DL (ref 0–199)
CO2: 28 MMOL/L (ref 21–32)
CREAT SERPL-MCNC: 1.2 MG/DL (ref 0.6–1.2)
GFR AFRICAN AMERICAN: 51
GFR NON-AFRICAN AMERICAN: 42
GLUCOSE BLD-MCNC: 100 MG/DL (ref 70–99)
HDLC SERPL-MCNC: 50 MG/DL (ref 40–60)
LDL CHOLESTEROL CALCULATED: 27 MG/DL
POTASSIUM SERPL-SCNC: 3.5 MMOL/L (ref 3.5–5.1)
PRO-BNP: 939 PG/ML (ref 0–449)
SODIUM BLD-SCNC: 147 MMOL/L (ref 136–145)
TRIGL SERPL-MCNC: 119 MG/DL (ref 0–150)
VITAMIN D 25-HYDROXY: 57.8 NG/ML
VLDLC SERPL CALC-MCNC: 24 MG/DL

## 2020-02-20 PROCEDURE — 83880 ASSAY OF NATRIURETIC PEPTIDE: CPT

## 2020-02-20 PROCEDURE — 84460 ALANINE AMINO (ALT) (SGPT): CPT

## 2020-02-20 PROCEDURE — 82306 VITAMIN D 25 HYDROXY: CPT

## 2020-02-20 PROCEDURE — 80061 LIPID PANEL: CPT

## 2020-02-20 PROCEDURE — 84450 TRANSFERASE (AST) (SGOT): CPT

## 2020-02-20 PROCEDURE — 36415 COLL VENOUS BLD VENIPUNCTURE: CPT

## 2020-02-20 PROCEDURE — 80048 BASIC METABOLIC PNL TOTAL CA: CPT

## 2020-02-24 ENCOUNTER — TELEPHONE (OUTPATIENT)
Dept: CARDIOLOGY CLINIC | Age: 85
End: 2020-02-24

## 2020-02-24 NOTE — TELEPHONE ENCOUNTER
Notified pt of Echo results below. She verbalized understanding. Notes recorded by Daniela Luis MD on 2/23/2020 at 5:08 PM EST  Please call the patient and inform her that her heart function has returned to normal. She should keep taking the medications she is on. I will see her again in December, 1 year from last appointment.

## 2020-03-18 ENCOUNTER — TELEPHONE (OUTPATIENT)
Dept: CARDIOLOGY CLINIC | Age: 85
End: 2020-03-18

## 2020-03-18 NOTE — TELEPHONE ENCOUNTER
Hugo Baxter know she can't have any kind of surgery for awhile since she had her stent put in. However, she is in such back pain and is asking if she can get Cortizone shots for the pain? Please call to advise. Thank you.

## 2020-03-18 NOTE — TELEPHONE ENCOUNTER
She is having yolanda hip injections, not spine. I told her she can have the injections as long as she does not stop the Plavix or asa. She stated her understanding and will discuss with her orthopedic physician.

## 2020-03-18 NOTE — TELEPHONE ENCOUNTER
Please call and let the patient know that she can not stop the plavix. Most spine surgeons will not perform spinal injections while taking plavix due to the risk of bleeding it could cause on the spine. She would have to ask the doctor who performs the back steroid shots if they are comfortable giving injections while taking plavix and aspirin.

## 2020-04-17 RX ORDER — PANTOPRAZOLE SODIUM 20 MG/1
20 TABLET, DELAYED RELEASE ORAL
Qty: 90 TABLET | Refills: 1 | OUTPATIENT
Start: 2020-04-17

## 2020-06-03 ENCOUNTER — TELEPHONE (OUTPATIENT)
Dept: CARDIOLOGY CLINIC | Age: 85
End: 2020-06-03

## 2020-06-03 RX ORDER — LOSARTAN POTASSIUM 25 MG/1
12.5 TABLET ORAL DAILY
Qty: 45 TABLET | Refills: 3 | Status: SHIPPED | OUTPATIENT
Start: 2020-06-03 | End: 2021-06-01 | Stop reason: SDUPTHER

## 2020-06-30 ENCOUNTER — HOSPITAL ENCOUNTER (OUTPATIENT)
Dept: VASCULAR LAB | Age: 85
Discharge: HOME | End: 2020-06-30
Payer: MEDICARE

## 2020-06-30 PROCEDURE — 93923 UPR/LXTR ART STDY 3+ LVLS: CPT

## 2020-07-01 ENCOUNTER — TELEPHONE (OUTPATIENT)
Dept: CARDIOLOGY CLINIC | Age: 85
End: 2020-07-01

## 2020-07-01 RX ORDER — METOPROLOL SUCCINATE 25 MG/1
12.5 TABLET, EXTENDED RELEASE ORAL DAILY
Qty: 45 TABLET | Refills: 3 | Status: SHIPPED | OUTPATIENT
Start: 2020-07-01 | End: 2020-12-14 | Stop reason: SINTOL

## 2020-07-01 NOTE — TELEPHONE ENCOUNTER
RX APPROVAL:      Refill:   Requested Prescriptions      No prescriptions requested or ordered in this encounter      Last OV: 1/21/2020   Last EKG:   Last Labs:   Lab Results   Component Value Date    GLUCOSE 100 02/20/2020    BUN 23 02/20/2020    CREATININE 1.2 02/20/2020    LABGLOM 42 02/20/2020     02/20/2020    K 3.5 02/20/2020     02/20/2020    CO2 28 02/20/2020    CALCIUM 9.9 02/20/2020     Lab Results   Component Value Date     02/20/2020     02/20/2020    CO2 28 02/20/2020    ANIONGAP 12 02/20/2020    GLUCOSE 100 02/20/2020    BUN 23 02/20/2020    CREATININE 1.2 02/20/2020    LABGLOM 42 02/20/2020    GFRAA 51 02/20/2020    CALCIUM 9.9 02/20/2020    AST 17 02/20/2020    ALT 10 02/20/2020     Lab Results   Component Value Date    ALT 10 02/20/2020    AST 17 02/20/2020     Lab Results   Component Value Date    K 3.5 02/20/2020       Plan and labs reviewed

## 2020-07-01 NOTE — TELEPHONE ENCOUNTER
Medication Refill    Medication needing refilled: metoprolol     Doseage of the medication: 25mg     How are you taking this medication (QD, BID, TID, QID, PRN):    30 or 90 day supply called in:    Which Pharmacy are we sending the medication to?:  [facility]

## 2020-08-03 ENCOUNTER — OFFICE VISIT (OUTPATIENT)
Dept: CARDIOLOGY CLINIC | Age: 85
End: 2020-08-03
Payer: MEDICARE

## 2020-08-03 ENCOUNTER — HOSPITAL ENCOUNTER (OUTPATIENT)
Age: 85
Discharge: HOME | End: 2020-08-03
Payer: MEDICARE

## 2020-08-03 VITALS
DIASTOLIC BLOOD PRESSURE: 58 MMHG | HEIGHT: 60 IN | SYSTOLIC BLOOD PRESSURE: 110 MMHG | WEIGHT: 121.8 LBS | OXYGEN SATURATION: 98 % | BODY MASS INDEX: 23.91 KG/M2 | HEART RATE: 71 BPM

## 2020-08-03 LAB
ANION GAP SERPL CALCULATED.3IONS-SCNC: 10 MMOL/L (ref 3–16)
BUN BLDV-MCNC: 25 MG/DL (ref 7–20)
CALCIUM SERPL-MCNC: 9 MG/DL (ref 8.3–10.6)
CHLORIDE BLD-SCNC: 106 MMOL/L (ref 99–110)
CO2: 27 MMOL/L (ref 21–32)
CREAT SERPL-MCNC: 1 MG/DL (ref 0.6–1.2)
GFR AFRICAN AMERICAN: >60
GFR NON-AFRICAN AMERICAN: 52
GLUCOSE BLD-MCNC: 110 MG/DL (ref 70–99)
POTASSIUM SERPL-SCNC: 4.2 MMOL/L (ref 3.5–5.1)
PRO-BNP: 1186 PG/ML (ref 0–449)
SODIUM BLD-SCNC: 143 MMOL/L (ref 136–145)

## 2020-08-03 PROCEDURE — G8420 CALC BMI NORM PARAMETERS: HCPCS | Performed by: CLINICAL NURSE SPECIALIST

## 2020-08-03 PROCEDURE — 83880 ASSAY OF NATRIURETIC PEPTIDE: CPT

## 2020-08-03 PROCEDURE — 1090F PRES/ABSN URINE INCON ASSESS: CPT | Performed by: CLINICAL NURSE SPECIALIST

## 2020-08-03 PROCEDURE — 4040F PNEUMOC VAC/ADMIN/RCVD: CPT | Performed by: CLINICAL NURSE SPECIALIST

## 2020-08-03 PROCEDURE — 80048 BASIC METABOLIC PNL TOTAL CA: CPT

## 2020-08-03 PROCEDURE — 36415 COLL VENOUS BLD VENIPUNCTURE: CPT

## 2020-08-03 PROCEDURE — 1123F ACP DISCUSS/DSCN MKR DOCD: CPT | Performed by: CLINICAL NURSE SPECIALIST

## 2020-08-03 PROCEDURE — 99214 OFFICE O/P EST MOD 30 MIN: CPT | Performed by: CLINICAL NURSE SPECIALIST

## 2020-08-03 PROCEDURE — G8427 DOCREV CUR MEDS BY ELIG CLIN: HCPCS | Performed by: CLINICAL NURSE SPECIALIST

## 2020-08-03 PROCEDURE — 1036F TOBACCO NON-USER: CPT | Performed by: CLINICAL NURSE SPECIALIST

## 2020-08-03 NOTE — PROGRESS NOTES
[facility]  Progress Note    Primary Care Doctor:  Vivienne Lim MD    Chief Complaint   Patient presents with    Coronary Artery Disease    Congestive Heart Failure        History of Present Illness:  80 y.o. female with history of arthritis and thyroid  11/5-8/19 for ACS with new sHF and LHC with PCI to the mid proximal to mid RCA. I had the pleasure of seeing João Sanjay in follow up for sHF. She is ambulatory by her self today. She is feeling well without complaints of chest pain, palpitations, sob or edema. She had injections in both hands for trigger finger. She has not had injections in her knees. Echo showed improved LVEF. Her  has been in the hospital and is finally recovering at home which is a lot of work for her. Last labs in Feb    Past Medical History:   has a past medical history of Acute systolic (congestive) heart failure ([address] Arthritis, Coronary artery disease involving native coronary artery of native heart without angina pectoris, and Thyroid disease. Surgical History:   has a past surgical history that includes back surgery; Hysterectomy; and Appendectomy. Social History:   reports that she has never smoked. She has never used smokeless tobacco. She reports previous alcohol use. She reports that she does not use drugs. Family History:   History reviewed. No pertinent family history. Home Medications:  Prior to Admission medications    Medication Sig Start Date End Date Taking? Authorizing Provider   metoprolol succinate (TOPROL XL) 25 MG extended release tablet Take 0.5 tablets by mouth daily 7/1/20  Yes Armando Calzada MD   losartan (COZAAR) 25 MG tablet Take 0.5 tablets by mouth daily 6/3/20  Yes Armando Calzada MD   HYDROcodone-acetaminophen [facility]) 5-325 MG per tablet Take 1 tablet by mouth daily as needed.  1/2/20  Yes Historical Provider, MD   vitamin C (ASCORBIC ACID) 500 MG tablet Take 500 mg by mouth daily   Yes Historical Provider, MD Multiple Vitamins-Minerals (PRESERVISION AREDS 2+MULTI VIT) CAPS Take 2 capsules by mouth daily   Yes Historical Provider, MD   CALTRATE 600+D PLUS MINERALS (CALTRATE) 600-800 MG-UNIT TABS tablet Take 2 tablets by mouth daily   Yes Historical Provider, MD   aspirin 81 MG EC tablet Take 1 tablet by mouth daily 11/9/19  Yes Surinder Woods MD   rosuvastatin (CRESTOR) 20 MG tablet Take 1 tablet by mouth nightly 11/8/19  Yes Surinder Woods MD   clopidogrel (PLAVIX) 75 MG tablet Take 1 tablet by mouth daily 11/9/19  Yes Surinder Woods MD   levothyroxine (SYNTHROID) 75 MCG tablet Take 75 mcg by mouth Daily   Yes Historical Provider, MD   fexofenadine (ALLEGRA ALLERGY) 60 MG tablet Take 60 mg by mouth daily   Yes Historical Provider, MD        Allergies:  Patient has no known allergies. Review of Systems:   · Constitutional: there has been no unanticipated weight loss. There's been no change in energy level, sleep pattern, or activity level. · Eyes: No visual changes or diplopia. No scleral icterus. · ENT: No Headaches, hearing loss or vertigo. No mouth sores or sore throat. · Cardiovascular: Reviewed in HPI  · Respiratory: No cough or wheezing, no sputum production. No hematemesis. · Gastrointestinal: No abdominal pain, appetite loss, blood in stools. No change in bowel or bladder habits. · Genitourinary: No dysuria, trouble voiding, or hematuria. · Musculoskeletal:  No gait disturbance, weakness or joint complaints. · Integumentary: No rash or pruritis. · Neurological: No headache, diplopia, change in muscle strength, numbness or tingling. No change in gait, balance, coordination, mood, affect, memory, mentation, behavior. · Psychiatric: No anxiety, no depression. · Endocrine: No malaise, fatigue or temperature intolerance. No excessive thirst, fluid intake, or urination. No tremor. · Hematologic/Lymphatic: No abnormal bruising or bleeding, blood clots or swollen lymph nodes.   · Allergic/Immunologic: No nasal congestion or hives. Physical Examination:    Vitals:    08/03/20 1501   BP: (!) 110/58   Site: Left Upper Arm   Position: Sitting   Cuff Size: Medium Adult   Pulse: 71   SpO2: 98%   Weight: 121 lb 12.8 oz (55.2 kg)   Height: 5' (1.524 m)        Constitutional and General Appearance: Warm and dry, no apparent distress, normal coloration  HEENT:  Normocephalic, atraumatic  Respiratory:  · Normal excursion and expansion without use of accessory muscles  · Resp Auscultation: Normal breath sounds without dullness  Cardiovascular:  · The apical impulses not displaced  · Heart tones are crisp and normal  · JVP normal cm H2O  · Regular rate and rhythm  · Peripheral pulses are symmetrical and full  · There is no clubbing, cyanosis of the extremities.   · no edema  · Pedal Pulses: 2+ and equal   Abdomen:  · No masses or tenderness  · Liver/Spleen: No Abnormalities Noted  Neurological/Psychiatric:  · Alert and oriented in all spheres  · Moves all extremities well  · Exhibits normal gait balance and coordination  · No abnormalities of mood, affect, memory, mentation, or behavior are noted    Lab Data:    CBC:   Lab Results   Component Value Date    WBC 9.5 11/07/2019    WBC 10.3 11/06/2019    WBC 15.4 11/05/2019    RBC 4.02 11/07/2019    RBC 3.87 11/06/2019    RBC 4.54 11/05/2019    HGB 12.4 11/07/2019    HGB 12.1 11/06/2019    HGB 14.1 11/05/2019    HCT 37.4 11/07/2019    HCT 36.3 11/06/2019    HCT 42.2 11/05/2019    MCV 93.1 11/07/2019    MCV 93.9 11/06/2019    MCV 92.9 11/05/2019    RDW 15.7 11/07/2019    RDW 15.5 11/06/2019    RDW 15.7 11/05/2019     11/07/2019     11/06/2019     11/05/2019     BMP:  Lab Results   Component Value Date     02/20/2020     11/19/2019     11/13/2019    K 3.5 02/20/2020    K 3.5 11/19/2019    K 5.5 11/13/2019     02/20/2020     11/19/2019     11/13/2019    CO2 28 02/20/2020    CO2 23 11/19/2019    CO2 25 11/13/2019    BUN 23 02/20/2020    BUN 17 11/19/2019    BUN 19 11/13/2019    CREATININE 1.2 02/20/2020    CREATININE 0.8 11/19/2019    CREATININE 0.9 11/13/2019     BNP:   Lab Results   Component Value Date    PROBNP 939 02/20/2020    PROBNP 1,512 11/19/2019    PROBNP 2,005 11/13/2019     Cardiac Imaging:  Echo 2/17/2020  Summary   -Normal left ventricle size,mild wall thickness and normal systolic function   with an estimated ejection fraction of 55%.   -No regional wall motion abnormalities are seen. -Grade I diastolic dysfunction with elevated LV filling pressures. E/e\"=14.6.   -Mild mitral regurgitation.   -Aortic valve appears sclerotic but opens adequately. -Trivial aortic regurgitation.   -Mild tricuspid regurgitation.   -Estimated pulmonary artery systolic pressure is normal at 25-30 mmHg   assuming a right atrial pressure of 3 mmHg    Cardiac Cath:   11/5/19:  Cardiac Cath LVG, PCI:  Anatomy:   LM-normal no disease   LAD-Proximal 50%  Cx-normal no disease   OM- normal no disease   Ramus-normal no disease   RCA- Dominant, proximal 100% thrombotic occlusion  RPDA- normal no disease   LVEF- 60%  LVG- basal inferior hypokinesis  LVEDP- 5     Echo;  11/5/19:   -There is mild left ventricular systolic dysfunction with inferior   hypokinesis and EF=45-50%. -The right ventricle appears dilated and hypokinetic. TAPSE is estimated at   1.16 cm.   -Aortic valve appears sclerotic but opens adequately. Trivial aortic   regurgitation.   -Mild mitral annular calcification noted. -Trivial mitral regurgitation.   -There is at least moderate tricuspid regurgitation with a RVSP estimation   of 35 mmHg. -The IVC is dilated . -Grade 1 diastolic dysfunction. Avg. E/e'=7.75   -A bubble study was performed and fails to show evidence of shunting. Assessment:    1. Chronic systolic heart failure (HCC) on arb and bb; no aldosterone due to elevated potassium; LVEF improved   2.  Coronary artery disease involving native coronary artery of native heart without angina pectoris    3. Ischemic cardiomyopathy        Plan:   Patient Instructions   1. Check blood work today  2. Continue all current medications  3. RTO in 6 months    I appreciate the opportunity of cooperating in the care of this individual.    PATEL Machuca, 8/3/2020, 3:35 PM    QUALITY MEASURES  1. Tobacco Cessation Counseling: NA  2. Retake of BP if >140/90:   NA  3. Documentation to PCP/referring for new patient:  Sent to PCP at close of office visit  4. CAD patient on anti-platelet: Yes  5. CAD patient on STATIN therapy:  Yes  6.  Patient with CHF and aFib on anticoagulation:  NA

## 2020-08-04 ENCOUNTER — TELEPHONE (OUTPATIENT)
Dept: CARDIOLOGY CLINIC | Age: 85
End: 2020-08-04

## 2020-08-04 NOTE — TELEPHONE ENCOUNTER
----- Message from DUKE Macias - CNS sent at 8/4/2020  8:24 AM EDT -----  Labs are good   Continue current medications  thanks

## 2020-10-27 ENCOUNTER — TELEPHONE (OUTPATIENT)
Dept: CARDIOLOGY CLINIC | Age: 85
End: 2020-10-27

## 2020-10-27 NOTE — TELEPHONE ENCOUNTER
Called and spoke to the patient she stated that she would like to go off of Plavix. She stated that she wants to know what [facility] would think. She stated that she looked it up and after a year of being on it that it stops working. Patient would like a call back before she goes and gets a refill for the medication.  Please advise, Thank you

## 2020-10-28 NOTE — TELEPHONE ENCOUNTER
[facility]. Reviewed with patient that it is OK to stop plavix.

## 2020-11-02 NOTE — TELEPHONE ENCOUNTER
Medication Refill    Medication needing refilled: rosuvastatin (CRESTOR) 20 MG tablet    Dosage of the medication: 20 mg    How are you taking this medication (QD, BID, TID, QID, PRN): 1 tab daily    30 or 90 day supply called in: 80    Which Pharmacy are we sending the medication to?: [facility] [address]

## 2020-11-03 RX ORDER — ROSUVASTATIN CALCIUM 20 MG/1
20 TABLET, COATED ORAL NIGHTLY
Qty: 90 TABLET | Refills: 3 | Status: SHIPPED | OUTPATIENT
Start: 2020-11-03 | End: 2020-12-14

## 2020-12-10 NOTE — ASSESSMENT & PLAN NOTE
Angina~ no  CCS class 1  Intervention~ STEMI- PCI to RCA  Current meds~ asa , crestor  Plan~ stopped plavix 10/2020. Cont aspirin. 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 Will reduce to 5mg QOD and start coq10. If symptoms persists will consider repatha.

## 2020-12-10 NOTE — PROGRESS NOTES
abused: Not on file     Physically abused: Not on file     Forced sexual activity: Not on file   Other Topics Concern    Not on file   Social History Narrative    Not on file       Family History:   History reviewed. No pertinent family history. Family history has been reviewed and not pertinent except as noted above. Review of Systems:   · Constitutional: there has been no unanticipated weight loss. No change in energy or activity level   · Eyes: No visual changes   · ENT: No Headaches, hearing loss or vertigo. No mouth sores or sore throat. · Cardiovascular: Reviewed in HPI  · Respiratory: No cough or wheezing, no sputum production. · Gastrointestinal: No abdominal pain, appetite loss, blood in stools. No change in bowel or bladder habits. · Genitourinary: No nocturia, dysuria, trouble voiding  · Musculoskeletal:  No gait disturbance, weakness or joint complaints. · Integumentary: No rash or pruritis. · Neurological: No headache, change in muscle strength, numbness or tingling. No change in gait, balance, coordination, mood, affect, memory, mentation, behavior. · Psychiatric: No anxiety or depression  · Endocrine: No malaise or fever  · Hematologic/Lymphatic: No abnormal bruising or bleeding, blood clots or swollen lymph nodes. · Allergic/Immunologic: No nasal congestion or hives. Physical Examination:    Vitals:    12/14/20 1323   BP: 110/60   Site: Left Upper Arm   Position: Sitting   Cuff Size: Medium Adult   Pulse: 76   Resp: 18   SpO2: 98%   Weight: 119 lb 3.2 oz (54.1 kg)   Height: 5' 2\" (1.575 m)     Body mass index is 21.8 kg/m². Wt Readings from Last 3 Encounters:   12/14/20 119 lb 3.2 oz (54.1 kg)   08/03/20 121 lb 12.8 oz (55.2 kg)   01/21/20 125 lb (56.7 kg)      BP Readings from Last 3 Encounters:   12/14/20 110/60   08/03/20 (!) 110/58   01/21/20 (!) 114/48        Physical Examination:    · CONSTITUTIONAL: Well developed, well nourished  · EYES: PERRLA.  No xanthelasma, sclera non icteric  · EARS,NOSE,MOUTH,THROAT:  Mucous membranes moist, normal hearing  · NECK: Supple, JVP normal, thyroid not enlarged. Carotids 2+ without bruits  · RESPIRATORY: Normal effort, no rales or rhonchi  · CARDIOVASCULAR: Normal PMI, regular rate and rhythm, no murmurs, rub or gallop. No edema. Radial pulses present and equal  · CHEST: No scar or masses  · ABDOMEN: Normal bowel sounds. No masses or tenderness. No bruit  · MUSCULOSKELETAL: No clubbing or cyanosis. Moves all extremities well. Normal gait  · SKIN:  Warm and dry. No rashes  · NEUROLOGIC: Cranial nerves intact. Alert and oriented  · PSYCHIATRIC: Calm affect. Appears to have normal judgement and insight    All testing and labs listed below were personally reviewed by myself. Echo 2/17/20  Summary  -Normal left ventricle size,mild wall thickness and normal systolic function with an estimated ejection fraction of 55%.  -No regional wall motion abnormalities are seen. -Grade I diastolic dysfunction with elevated LV filling pressures. E/e\"=14.6.  -Mild mitral regurgitation.   -Aortic valve appears sclerotic but opens adequately. -Trivial aortic regurgitation. Mild tricuspid regurgitation.  -Estimated pulmonary artery systolic pressure is normal at 25-30 mmHg assuming a right atrial pressure of 3 mmHg. Cardiac Cath LVG, PCI: 11/5/19  Anatomy:   LM-normal no disease   LAD-Proximal 50%  Cx-normal no disease   OM- normal no disease   Ramus-normal no disease   RCA- Dominant, proximal 100% thrombotic occlusion  RPDA- normal no disease   LVEF- 60%  LVG- basal inferior hypokinesis  LVEDP- 5     Intervention  ~Successful PCI to proximal to mid RCA with 3.25x38 AVERY and 2.75x12 AVERY. Post dilated with 3.5x20 NC to 18atm. BECKI 0 to BECKI 3, 100% down to 0%. Excellent Result.       Contrast: 105  Flouro Time: 16.5  Access: R radial artery     Impression  ~Coronary Angiography w/ STEMI of   ~LVG with LVEF of 60 and basal regional wall motion abnormalities  ~Successful complex angioplasty and stenting of RCA. Echo 11/5/19  Summary   -There is mild left ventricular systolic dysfunction with inferior hypokinesis and EF=45-50%. -The right ventricle appears dilated and hypokinetic. TAPSE is estimated at 1.16 cm.   -Aortic valve appears sclerotic but opens adequately. -Trivial aortic regurgitation.   -Mild mitral annular calcification noted. -Trivial mitral regurgitation.   -There is at least moderate tricuspid regurgitation with a RVSP estimation of 35 mmHg. -The IVC is dilated . -Grade 1 diastolic dysfunction. Avg. E/e'=7.75 -A bubble study was performed and fails to show evidence of shunting. Assessment/Plan  1. Coronary artery disease involving native coronary artery of native heart without angina pectoris    2. Cardiomyopathy, ischemic    3. Chronic systolic congestive heart failure (HCC)          Coronary artery disease involving native coronary artery of native heart without angina pectoris  Angina~ no  CCS class 1  Intervention~ STEMI- PCI to RCA  Current meds~ asa , crestor  Plan~ stopped plavix 10/2020. Cont aspirin. 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 Will reduce to 5mg QOD and start coq10. If symptoms persists will consider repatha. Chronic systolic congestive heart failure (HCC)  Asymptomatic  NYHA score~ 1  EF~ 55% (improved from 45% after PCI)  Current HF meds~ Toprol / losartan  Plan~ resolved; Stop toprol xl due to symptomatic hypotension    Cardiomyopathy, ischemic  Improved~ Nonischemic by echo 2/2020      No orders of the defined types were placed in this encounter. Chandra Hawkins MD      Thank you for allowing to me to participate in the care of Faina Perez. Scribe's Attestation:  This note was scribed in the presence of Dr. Julieth Morgan MD by Azucena Small RN.    I, Dr. Julieth Morgan, personally performed the services described in this documentation, as scribed by the above signed scribe in my presence. It is both accurate and complete to my knowledge. I agree with the details independently gathered by the clinical support staff, while the remaining scribed note accurately describes my personal service to the patient.

## 2020-12-10 NOTE — ASSESSMENT & PLAN NOTE
Asymptomatic  NYHA score~ 1  EF~ 55% (improved from 45% after PCI)  Current HF meds~ Toprol / losartan  Plan~ resolved;   Stop toprol xl due to symptomatic hypotension

## 2020-12-14 ENCOUNTER — OFFICE VISIT (OUTPATIENT)
Dept: CARDIOLOGY CLINIC | Age: 85
End: 2020-12-14
Payer: MEDICARE

## 2020-12-14 VITALS
HEART RATE: 76 BPM | WEIGHT: 119.2 LBS | DIASTOLIC BLOOD PRESSURE: 60 MMHG | BODY MASS INDEX: 21.94 KG/M2 | OXYGEN SATURATION: 98 % | RESPIRATION RATE: 18 BRPM | HEIGHT: 62 IN | SYSTOLIC BLOOD PRESSURE: 110 MMHG

## 2020-12-14 PROCEDURE — 1090F PRES/ABSN URINE INCON ASSESS: CPT | Performed by: INTERNAL MEDICINE

## 2020-12-14 PROCEDURE — 1036F TOBACCO NON-USER: CPT | Performed by: INTERNAL MEDICINE

## 2020-12-14 PROCEDURE — G8484 FLU IMMUNIZE NO ADMIN: HCPCS | Performed by: INTERNAL MEDICINE

## 2020-12-14 PROCEDURE — G8427 DOCREV CUR MEDS BY ELIG CLIN: HCPCS | Performed by: INTERNAL MEDICINE

## 2020-12-14 PROCEDURE — 99214 OFFICE O/P EST MOD 30 MIN: CPT | Performed by: INTERNAL MEDICINE

## 2020-12-14 PROCEDURE — 1123F ACP DISCUSS/DSCN MKR DOCD: CPT | Performed by: INTERNAL MEDICINE

## 2020-12-14 PROCEDURE — 4040F PNEUMOC VAC/ADMIN/RCVD: CPT | Performed by: INTERNAL MEDICINE

## 2020-12-14 PROCEDURE — G8420 CALC BMI NORM PARAMETERS: HCPCS | Performed by: INTERNAL MEDICINE

## 2020-12-14 RX ORDER — UBIDECARENONE 100 MG
100 CAPSULE ORAL DAILY
Qty: 90 CAPSULE | Refills: 3 | Status: SHIPPED | OUTPATIENT
Start: 2020-12-14

## 2020-12-14 RX ORDER — TRAMADOL HYDROCHLORIDE 50 MG/1
TABLET ORAL
COMMUNITY
Start: 2020-10-19

## 2020-12-14 RX ORDER — ROSUVASTATIN CALCIUM 5 MG/1
5 TABLET, COATED ORAL EVERY OTHER DAY
Qty: 90 TABLET | Refills: 3 | Status: SHIPPED | OUTPATIENT
Start: 2020-12-14 | End: 2022-06-10

## 2021-06-01 DIAGNOSIS — I50.22 CHRONIC SYSTOLIC HEART FAILURE (HCC): ICD-10-CM

## 2021-06-01 NOTE — TELEPHONE ENCOUNTER
Medication Refill    Medication needing refilled: losartan (COZAAR) 25 MG tablet    Dosage of the medication: 25 mg    How are you taking this medication (QD, BID, TID, QID, PRN): Take 0.5 tablets by mouth daily      30 or 90 day supply called in: 90    When will you run out of your medication: Pt has few days left    Which Pharmacy are we sending the medication to?: [facility] [address] [phone] [facility] [phone]   [address], [facility] [address]   Phone:  [phone]  Fax:  [phone]

## 2021-06-02 RX ORDER — LOSARTAN POTASSIUM 25 MG/1
12.5 TABLET ORAL DAILY
Qty: 45 TABLET | Refills: 3 | Status: SHIPPED | OUTPATIENT
Start: 2021-06-02 | End: 2022-04-28 | Stop reason: SDUPTHER

## 2021-06-14 ENCOUNTER — OFFICE VISIT (OUTPATIENT)
Dept: CARDIOLOGY CLINIC | Age: 86
End: 2021-06-14
Payer: MEDICARE

## 2021-06-14 VITALS
OXYGEN SATURATION: 96 % | HEART RATE: 80 BPM | WEIGHT: 117 LBS | SYSTOLIC BLOOD PRESSURE: 126 MMHG | DIASTOLIC BLOOD PRESSURE: 84 MMHG | BODY MASS INDEX: 21.53 KG/M2 | HEIGHT: 62 IN

## 2021-06-14 DIAGNOSIS — I50.22 CHRONIC SYSTOLIC CONGESTIVE HEART FAILURE (HCC): ICD-10-CM

## 2021-06-14 DIAGNOSIS — I42.8 NON-ISCHEMIC CARDIOMYOPATHY (HCC): ICD-10-CM

## 2021-06-14 DIAGNOSIS — I25.10 CORONARY ARTERY DISEASE INVOLVING NATIVE CORONARY ARTERY OF NATIVE HEART WITHOUT ANGINA PECTORIS: Primary | ICD-10-CM

## 2021-06-14 PROCEDURE — G8420 CALC BMI NORM PARAMETERS: HCPCS | Performed by: INTERNAL MEDICINE

## 2021-06-14 PROCEDURE — 1090F PRES/ABSN URINE INCON ASSESS: CPT | Performed by: INTERNAL MEDICINE

## 2021-06-14 PROCEDURE — 1123F ACP DISCUSS/DSCN MKR DOCD: CPT | Performed by: INTERNAL MEDICINE

## 2021-06-14 PROCEDURE — G8427 DOCREV CUR MEDS BY ELIG CLIN: HCPCS | Performed by: INTERNAL MEDICINE

## 2021-06-14 PROCEDURE — 4040F PNEUMOC VAC/ADMIN/RCVD: CPT | Performed by: INTERNAL MEDICINE

## 2021-06-14 PROCEDURE — 99214 OFFICE O/P EST MOD 30 MIN: CPT | Performed by: INTERNAL MEDICINE

## 2021-06-14 PROCEDURE — 1036F TOBACCO NON-USER: CPT | Performed by: INTERNAL MEDICINE

## 2021-06-14 NOTE — ASSESSMENT & PLAN NOTE
Asymptomatic?   NYHA score~   EF~ 55% (improved from 45% after PCI)  Current HF meds~ losartan  Plan~

## 2021-06-14 NOTE — PROGRESS NOTES
day 90 tablet 3    coenzyme Q10 100 MG CAPS capsule Take 1 capsule by mouth daily 90 capsule 3    vitamin C (ASCORBIC ACID) 500 MG tablet Take 500 mg by mouth daily      Multiple Vitamins-Minerals (PRESERVISION AREDS 2+MULTI VIT) CAPS Take 2 capsules by mouth daily      CALTRATE 600+D PLUS MINERALS (CALTRATE) 600-800 MG-UNIT TABS tablet Take 2 tablets by mouth daily      aspirin 81 MG EC tablet Take 1 tablet by mouth daily 30 tablet 3    levothyroxine (SYNTHROID) 75 MCG tablet Take 75 mcg by mouth Daily      fexofenadine (ALLEGRA ALLERGY) 60 MG tablet Take 60 mg by mouth daily       No current facility-administered medications for this visit. Allergies:  Propoxyphene and Adhesive tape     Social History:  Social History     Socioeconomic History    Marital status:      Spouse name: Not on file    Number of children: Not on file    Years of education: Not on file    Highest education level: Not on file   Occupational History    Not on file   Tobacco Use    Smoking status: Former Smoker    Smokeless tobacco: Never Used   Substance and Sexual Activity    Alcohol use: Not Currently    Drug use: Never    Sexual activity: Not Currently   Other Topics Concern    Not on file   Social History Narrative    Not on file     Social Determinants of Health     Financial Resource Strain:     Difficulty of Paying Living Expenses:    Food Insecurity:     Worried About Running Out of Food in the Last Year:     [address] in the Last Year:    Transportation Needs:     Lack of Transportation (Medical):      Lack of Transportation (Non-Medical):    Physical Activity:     Days of Exercise per Week:     Minutes of Exercise per Session:    Stress:     Feeling of Stress :    Social Connections:     Frequency of Communication with Friends and Family:     Frequency of Social Gatherings with Friends and Family:     Attends Sabianist Services:     Active Member of Clubs or Organizations:     icteric  · EARS,NOSE,MOUTH,THROAT:  Mucous membranes moist, normal hearing  · NECK: Supple, JVP normal, thyroid not enlarged. Carotids 2+ without bruits  · RESPIRATORY: Normal effort, no rales or rhonchi  · CARDIOVASCULAR: Normal PMI, regular rate and rhythm, no murmurs, rub or gallop. No edema. Radial pulses present and equal  · CHEST: No scar or masses  · ABDOMEN: Normal bowel sounds. No masses or tenderness. No bruit  · MUSCULOSKELETAL: No clubbing or cyanosis. Moves all extremities well. Normal gait  · SKIN:  Warm and dry. No rashes  · NEUROLOGIC: Cranial nerves intact. Alert and oriented  · PSYCHIATRIC: Calm affect. Appears to have normal judgement and insight    All testing and labs listed below were personally reviewed by myself. Echo 2/17/20  Summary  Normal left ventricle size,mild wall thickness and normal systolic function with an estimated ejection fraction of 55%. No regional wall motion abnormalities are seen. Grade I diastolic dysfunction with elevated LV filling pressures. E/e\"=14.6. Mild mitral regurgitation. Aortic valve appears sclerotic but opens adequately. Trivial aortic regurgitation. Mild tricuspid regurgitation. Estimated pulmonary artery systolic pressure is normal at 25-30 mmHg assuming a right atrial pressure of 3 mmHg. Cardiac Cath LVG, PCI: 11/5/19  Anatomy:   LM-normal no disease   LAD-Proximal 50%  Cx-normal no disease   OM- normal no disease   Ramus-normal no disease   RCA- Dominant, proximal 100% thrombotic occlusion  RPDA- normal no disease   LVEF- 60%  LVG- basal inferior hypokinesis  LVEDP- 5     Intervention  ~Successful PCI to proximal to mid RCA with 3.25x38 AVERY and 2.75x12 AVERY. Post dilated with 3.5x20 NC to 18atm. BECKI 0 to BECKI 3, 100% down to 0%. Excellent Result.       Contrast: 105  Flouro Time: 16.5  Access: R radial artery     Impression  ~Coronary Angiography w/ STEMI of   ~LVG with LVEF of 60 and basal regional wall motion abnormalities  ~Successful complex angioplasty and stenting of RCA. Echo 11/5/19  Summary  There is mild left ventricular systolic dysfunction with inferior hypokinesis and EF=45-50%. The right ventricle appears dilated and hypokinetic. TAPSE is estimated at 1.16 cm. Aortic valve appears sclerotic but opens adequately. -Trivial aortic regurgitation. Mild mitral annular calcification noted. Trivial mitral regurgitation. There is at least moderate tricuspid regurgitation with a RVSP estimation of 35 mmHg. The IVC is dilated . -Grade 1 diastolic dysfunction. Avg. E/e'=7.75 -A bubble study was performed and fails to show evidence of shunting. Assessment/Plan  1. Coronary artery disease involving native coronary artery of native heart without angina pectoris    2. Chronic systolic congestive heart failure ([address]    3. Non-ischemic cardiomyopathy ([address]          Coronary artery disease involving native coronary artery of native heart without angina pectoris  Angina no  CCS class 1  Intervention  [facility]~ 11/19 PCI to RCA with AVERY  Current meds~ asa  Plan~ cont meds. OK to proceed with knee injections. Chronic systolic congestive heart failure (HCC)  Asymptomatic? NYHA score~   EF~ 55% (improved from 45% after PCI)  Current HF meds~ losartan  Plan~     Non-ischemic cardiomyopathy ([address]  Improved~ Nonischemic by echo 2/2020      No orders of the defined types were placed in this encounter. Follow up 1 year    Mary Ge MD      Thank you for allowing to me to participate in the care of Faina Perez. Scribe's Attestation: This note was scribed in the presence of Dr. Geovanny Hernandez MD by Karan Kwon RN.     I, Dr. Geovanny Hernandez, personally performed the services described in this documentation, as scribed by the above signed scribe in my presence. It is both accurate and complete to my knowledge.  I agree with the details independently gathered by the clinical support staff, while the remaining scribed note

## 2021-06-14 NOTE — PATIENT INSTRUCTIONS
Follow with your ortho Dr for your knee pain  CBD is safe from a heart perspective   [address] [place]

## 2022-04-28 DIAGNOSIS — I50.22 CHRONIC SYSTOLIC HEART FAILURE (HCC): ICD-10-CM

## 2022-04-28 RX ORDER — LOSARTAN POTASSIUM 25 MG/1
12.5 TABLET ORAL DAILY
Qty: 45 TABLET | Refills: 3 | Status: SHIPPED | OUTPATIENT
Start: 2022-04-28

## 2022-04-28 NOTE — TELEPHONE ENCOUNTER
Medication Refill    Medication needing refilled: Losartan    Dosage of the medication: 25 mg    How are you taking this medication (QD, BID, TID, QID, PRN): 0.5 tablets by mouth    30 or 90 day supply called in: 90    When will you run out of your medication: [address] are we sending the medication to?: [facility] [address] [place], [place] [id number] [facility] [phone]   [facility] [address] [facility] [address]   Phone:  [phone]  Fax:  [phone]

## 2022-04-28 NOTE — TELEPHONE ENCOUNTER
Last OV: 06/14/21 [facility]  Last Labs: 08/03/20  Next appt: 07/08/22 [place]

## 2022-06-09 NOTE — TELEPHONE ENCOUNTER
Received refill request for Crestor from [facility].     Last ov: 06/14/2021 [facility]    Last labs: 08/03/2020    Last Refill: 12/14/2020 #90 w/ 3 refills    Next appointment:07/08/2022  [facility] [place]

## 2022-06-10 RX ORDER — ROSUVASTATIN CALCIUM 5 MG/1
TABLET, COATED ORAL
Qty: 45 TABLET | Refills: 3 | Status: SHIPPED | OUTPATIENT
Start: 2022-06-10

## 2022-06-27 PROBLEM — E78.2 MIXED HYPERLIPIDEMIA: Status: ACTIVE | Noted: 2022-06-27

## 2022-06-27 NOTE — PROGRESS NOTES
[facility]   Cardiac Evaluation      Patient: Luz Perez  YOB: 1932         Chief Complaint   Patient presents with    1 Year Follow Up    Coronary Artery Disease    Hyperlipidemia        Referring provider: Oliver Olivo MD    History of Present Illness:  Lusi Perez is an 80 y.o. is here for routine follow up and management of CAD s/p STEMI Nov '19 with EF 45% that has since improved to 55%. She also has history of CHF, NICM and follows with JEANETH Sosa. Presented  to ER with mid sternal CP/SOB n/v during time of STEMI. She lost her  April '22    Today she is here with her daughter. She is here via wheelchair (for longer distances). She states she has no chest pain or sob. She takes all of her medications with no complaints. With regard to medication therapy he/she has been compliant with prescribed regimen and has tolerated therapy to date. Past Medical History:   has a past medical history of Acute systolic (congestive) heart failure ([address] Arthritis, Coronary artery disease involving native coronary artery of native heart without angina pectoris, Mixed hyperlipidemia, and Thyroid disease. Surgical History:   has a past surgical history that includes back surgery; Hysterectomy; and Appendectomy.      Current Outpatient Medications   Medication Sig Dispense Refill    rosuvastatin (CRESTOR) 5 MG tablet TAKE 1 TABLET BY MOUTH EVERY DAY 45 tablet 3    losartan (COZAAR) 25 MG tablet Take 0.5 tablets by mouth daily 45 tablet 3    traMADol (ULTRAM) 50 MG tablet TK 1 T PO BID      coenzyme Q10 100 MG CAPS capsule Take 1 capsule by mouth daily 90 capsule 3    vitamin C (ASCORBIC ACID) 500 MG tablet Take 500 mg by mouth daily      Multiple Vitamins-Minerals (PRESERVISION AREDS 2+MULTI VIT) CAPS Take 2 capsules by mouth daily      CALTRATE 600+D PLUS MINERALS (CALTRATE) 600-800 MG-UNIT TABS tablet Take 2 tablets by mouth daily      aspirin 81 MG EC tablet Take 1 tablet by mouth daily 30 tablet 3    levothyroxine (SYNTHROID) 75 MCG tablet Take 75 mcg by mouth Daily      fexofenadine (ALLEGRA ALLERGY) 60 MG tablet Take 60 mg by mouth daily       No current facility-administered medications for this visit. Allergies:  Propoxyphene and Adhesive tape     Social History:  Social History     Socioeconomic History    Marital status:      Spouse name: Not on file    Number of children: Not on file    Years of education: Not on file    Highest education level: Not on file   Occupational History    Not on file   Tobacco Use    Smoking status: Former Smoker    Smokeless tobacco: Never Used   Vaping Use    Vaping Use: Never used   Substance and Sexual Activity    Alcohol use: Not Currently    Drug use: Never    Sexual activity: Not Currently   Other Topics Concern    Not on file   Social History Narrative    Not on file     Social Determinants of Health     Financial Resource Strain:     Difficulty of Paying Living Expenses: Not on file   Food Insecurity:     Worried About [address] in the Last Year: Not on file    [address] in the Last Year: Not on file   Transportation Needs:     Lack of Transportation (Medical): Not on file    Lack of Transportation (Non-Medical):  Not on file   Physical Activity:     Days of Exercise per Week: Not on file    Minutes of Exercise per Session: Not on file   Stress:     Feeling of Stress : Not on file   Social Connections:     Frequency of Communication with Friends and Family: Not on file    Frequency of Social Gatherings with Friends and Family: Not on file    Attends Mu-ism Services: Not on file    Active Member of Clubs or Organizations: Not on file    Attends Club or Organization Meetings: Not on file    Marital Status: Not on file   Intimate Partner Violence:     Fear of Current or Ex-Partner: Not on file    Emotionally Abused: Not on file    Physically Abused: Not on file    Sexually Abused: Not on file   Housing Stability:     Unable to Pay for Housing in the Last Year: Not on file    Number of Places Lived in the Last Year: Not on file    Unstable Housing in the Last Year: Not on file       Family History:   History reviewed. No pertinent family history. Family history has been reviewed and not pertinent except as noted above. Review of Systems:   · Constitutional: there has been no unanticipated weight loss. No change in energy or activity level   · Eyes: No visual changes   · ENT: No Headaches, hearing loss or vertigo. No mouth sores or sore throat. · Cardiovascular: Reviewed in HPI  · Respiratory: No cough or wheezing, no sputum production. · Gastrointestinal: No abdominal pain, appetite loss, blood in stools. No change in bowel or bladder habits. · Genitourinary: No nocturia, dysuria, trouble voiding  · Musculoskeletal:  No gait disturbance, weakness or joint complaints. · Integumentary: No rash or pruritis. · Neurological: No headache, change in muscle strength, numbness or tingling. No change in gait, balance, coordination, mood, affect, memory, mentation, behavior. · Psychiatric: No anxiety or depression  · Endocrine: No malaise or fever  · Hematologic/Lymphatic: No abnormal bruising or bleeding, blood clots or swollen lymph nodes. · Allergic/Immunologic: No nasal congestion or hives. Physical Examination:    Vitals:    07/08/22 1314   BP: 114/62   Pulse: 78   SpO2: 98%   Weight: 124 lb 9.6 oz (56.5 kg)   Height: 5' (1.524 m)     Body mass index is 24.33 kg/m². Wt Readings from Last 3 Encounters:   07/08/22 124 lb 9.6 oz (56.5 kg)   06/14/21 117 lb (53.1 kg)   12/14/20 119 lb 3.2 oz (54.1 kg)      BP Readings from Last 3 Encounters:   07/08/22 114/62   06/14/21 126/84   12/14/20 110/60        Physical Examination:    · CONSTITUTIONAL: Well developed, well nourished  · EYES: PERRLA.  No xanthelasma, sclera non icteric  · EARS,NOSE,MOUTH,THROAT: Mucous membranes moist, normal hearing  · NECK: Supple, JVP normal, thyroid not enlarged. Carotids 2+ without bruits  · RESPIRATORY: Normal effort, no rales or rhonchi  · CARDIOVASCULAR: Normal PMI, regular rate and rhythm, no murmurs, rub or gallop. No edema. Radial pulses present and equal  · CHEST: No scar or masses  · ABDOMEN: Normal bowel sounds. No masses or tenderness. No bruit  · MUSCULOSKELETAL: No clubbing or cyanosis. Moves all extremities well. Normal gait  · SKIN:  Warm and dry. No rashes  · NEUROLOGIC: Cranial nerves intact. Alert and oriented  · PSYCHIATRIC: Calm affect. Appears to have normal judgement and insight    All testing and labs listed below were personally reviewed by myself. Echo 2/17/20  Summary  Normal left ventricle size,mild wall thickness and normal systolic function with an estimated ejection fraction of 55%. No regional wall motion abnormalities are seen. Grade I diastolic dysfunction with elevated LV filling pressures. E/e\"=14.6. Mild mitral regurgitation. Aortic valve appears sclerotic but opens adequately. Trivial aortic regurgitation. Mild tricuspid regurgitation. Estimated pulmonary artery systolic pressure is normal at 25-30 mmHg assuming a right atrial pressure of 3 mmHg. Cardiac Cath LVG, PCI: 11/5/19  Anatomy:   LM-normal no disease   LAD-Proximal 50%  Cx-normal no disease   OM- normal no disease   Ramus-normal no disease   RCA- Dominant, proximal 100% thrombotic occlusion  RPDA- normal no disease   LVEF- 60%  LVG- basal inferior hypokinesis  LVEDP- 5     Intervention  ~Successful PCI to proximal to mid RCA with 3.25x38 AVERY and 2.75x12 AVERY. Post dilated with 3.5x20 NC to 18atm. BECKI 0 to BECKI 3, 100% down to 0%. Excellent Result.       Contrast: 105  Flouro Time: 16.5  Access: R radial artery     Impression  ~Coronary Angiography w/ STEMI of   ~LVG with LVEF of 60 and basal regional wall motion abnormalities  ~Successful complex angioplasty and stenting of RCA.      Echo 11/5/19  Summary  There is mild left ventricular systolic dysfunction with inferior hypokinesis and EF=45-50%. The right ventricle appears dilated and hypokinetic. TAPSE is estimated at 1.16 cm. Aortic valve appears sclerotic but opens adequately. -Trivial aortic regurgitation. Mild mitral annular calcification noted. Trivial mitral regurgitation. There is at least moderate tricuspid regurgitation with a RVSP estimation of 35 mmHg. The IVC is dilated . -Grade 1 diastolic dysfunction. Avg. E/e'=7.75 -A bubble study was performed and fails to show evidence of shunting. Assessment/Plan  1. ST elevation myocardial infarction involving right coronary artery ([facility]    2. Coronary artery disease involving native coronary artery of native heart without angina pectoris    3. Non-ischemic cardiomyopathy ([address]    4. Chronic systolic congestive heart failure ([facility]    5. Mixed hyperlipidemia          Coronary artery disease involving native coronary artery of native heart without angina pectoris  Angina none  CCS class 1  Intervention  [facility]~ STEMI 11/19 PCI to RCA with AVERY  Current meds~ asa  Plan~ stable     Chronic systolic congestive heart failure (HCC)  Non ischemic cardiomyopathy  Asymptomatic   NYHA score~ 1  EF~ 2/2020 55%  Current HF meds~ losartan  Plan~ stable     Hyperlipidemia   2/2020       LDL  27   HDL 50  Meds~ Crestor 5 mg every other day d/t myalgias / CoQ 10  Plan~ minor leg aches, tolerates ok. Repeat lipid panel    follow up 1 year    No orders of the defined types were placed in this encounter. Amadeo Reaves MD      Thank you for allowing to me to participate in the care of Faina Perez. Scribe's Attestation: This note was scribed in the presence of Dr. Tiana Villeda MD by Foreign [place], ABIGAIL.     I, Dr. Tiana Villeda, personally performed the services described in this documentation, as scribed by the above signed scribe in my presence.  It is both accurate and complete to my knowledge. I agree with the details independently gathered by the clinical support staff, while the remaining scribed note accurately describes my personal service to the patient.

## 2022-06-27 NOTE — ASSESSMENT & PLAN NOTE
Angina no  CCS class 1  Intervention  [facility]~ 11/19 PCI to RCA with AVERY  Current meds~ asa  Plan~ cont meds. OK to proceed with knee injections.

## 2022-07-08 ENCOUNTER — OFFICE VISIT (OUTPATIENT)
Dept: CARDIOLOGY CLINIC | Age: 87
End: 2022-07-08
Payer: MEDICARE

## 2022-07-08 VITALS
OXYGEN SATURATION: 98 % | WEIGHT: 124.6 LBS | DIASTOLIC BLOOD PRESSURE: 62 MMHG | BODY MASS INDEX: 24.46 KG/M2 | HEART RATE: 78 BPM | HEIGHT: 60 IN | SYSTOLIC BLOOD PRESSURE: 114 MMHG

## 2022-07-08 DIAGNOSIS — I42.8 NON-ISCHEMIC CARDIOMYOPATHY (HCC): ICD-10-CM

## 2022-07-08 DIAGNOSIS — I25.10 CORONARY ARTERY DISEASE INVOLVING NATIVE CORONARY ARTERY OF NATIVE HEART WITHOUT ANGINA PECTORIS: ICD-10-CM

## 2022-07-08 DIAGNOSIS — I21.11 ST ELEVATION MYOCARDIAL INFARCTION INVOLVING RIGHT CORONARY ARTERY (HCC): Primary | ICD-10-CM

## 2022-07-08 DIAGNOSIS — E78.2 MIXED HYPERLIPIDEMIA: ICD-10-CM

## 2022-07-08 DIAGNOSIS — I50.22 CHRONIC SYSTOLIC CONGESTIVE HEART FAILURE (HCC): ICD-10-CM

## 2022-07-08 PROCEDURE — G8420 CALC BMI NORM PARAMETERS: HCPCS | Performed by: INTERNAL MEDICINE

## 2022-07-08 PROCEDURE — 99214 OFFICE O/P EST MOD 30 MIN: CPT | Performed by: INTERNAL MEDICINE

## 2022-07-08 PROCEDURE — 1123F ACP DISCUSS/DSCN MKR DOCD: CPT | Performed by: INTERNAL MEDICINE

## 2022-07-08 PROCEDURE — G8427 DOCREV CUR MEDS BY ELIG CLIN: HCPCS | Performed by: INTERNAL MEDICINE

## 2022-07-08 PROCEDURE — 1036F TOBACCO NON-USER: CPT | Performed by: INTERNAL MEDICINE

## 2022-07-08 PROCEDURE — 1090F PRES/ABSN URINE INCON ASSESS: CPT | Performed by: INTERNAL MEDICINE

## 2022-08-04 ENCOUNTER — TELEPHONE (OUTPATIENT)
Dept: CARDIOLOGY CLINIC | Age: 87
End: 2022-08-04

## 2022-08-04 NOTE — TELEPHONE ENCOUNTER
Daughter states pt tested positive for covid today. Pt is achy and a cough. Daughter states PCP Dr Osmany Sanchez has retired and asking if [facility] could call something in. Please call to discuss.

## 2022-08-05 ENCOUNTER — TELEPHONE (OUTPATIENT)
Dept: CARDIOLOGY CLINIC | Age: 87
End: 2022-08-05

## 2022-08-05 NOTE — TELEPHONE ENCOUNTER
Delvin King( pt daughter)  called in and stated that she was waiting on  a call from [facility]. Her mother power has went out which maria fernanda her phone isn't working.     They need [facility] to return the phone call to [phone]

## 2022-08-08 ENCOUNTER — NURSE TRIAGE (OUTPATIENT)
Dept: OTHER | Facility: CLINIC | Age: 87
End: 2022-08-08

## 2022-08-08 NOTE — TELEPHONE ENCOUNTER
Received call from Cecille Morton at [facility] with Red Flag Complaint. Subjective: Caller states \"It started on Thursday morning. She called me and said she wasn't feeling well. I stayed with her Thursday and Friday. Saturday afternoon she started feeling better. But she had a terrible cough, it sounded like Bronchitis. Her fever started at 100.1F, it went down to 99.1F. She had diarrhea once. She's weak and exhausted. \"     Limited Triage d/t patient not with caller    Current Symptoms: cough-dry- improving, fever, soreness in chest with coughing, weakness, exhaustion, NO difficulty breathing, reduced appetite/fluid intake    Covid Negative x3, Family members all negative as well    Hx of Bronchitis  Patient taking Synthroid and had stent placement x 2 years ago    Onset: 5 days ago; improving    Associated Symptoms: reduced activity, increased sleepiness, diarrhea    Pain Severity: Unable to rate d/t patient not with caller    Temperature: 98.9F- yesterday by forehead thermometer    What has been tried: Nothing    LMP: NA Pregnant: NA    Recommended disposition: Go to ED/UCC Now (Or to Office with PCP Approval). Writer informed caller that office may not take call d/t patient not currently established since provider retired. Writer advised patint to be seen at UCC/ED if office will not take call. Caller states patient will NOT go to UCC/ED. Care advice provided, patient verbalizes understanding; denies any other questions or concerns; instructed to call back for any new or worsening symptoms. Writer provided warm transfer to [place] at [address] [facility] for 2nd Level Triage/further care. Attention Provider: Thank you for allowing me to participate in the care of your patient. The patient was connected to triage in response to information provided to the ECC/PSC. Please do not respond through this encounter as the response is not directed to a shared pool.       Reason for Disposition   Patient sounds very sick or weak to the triager    Protocols used: Weakness (Generalized) and Fatigue-ADULT-OH

## 2023-01-16 NOTE — TELEPHONE ENCOUNTER
82M DM, CAD, CHF, Dementia, CKD  bibems accompanied by daughter after a fall this evening. Nephrology consulted for acute on ckd    CHIARA on CKD stage 4  baseline ~ 2.1 12/2022  CHIARA possible ATN vs prerenal  renal function improving  diuretic ACE on hold  ivf as ordered  Feurea <35% suggestive of prerenal  abd us without hydro  monitor bmp and urine output  avoid nephrotoxic agents  As per discussion regarding HD with family and ICU providers.  Patient is on multiple vasopressure-d/w wife and daughter and they don't want him to go through dialysis    proteinuria  mild  urine p/c ratio 0.2  not significant     acidosis  lactic acidosis   improving  monitor    covid/pna  care per team     Response in another encounter

## 2023-01-18 ENCOUNTER — HOSPITAL ENCOUNTER (OUTPATIENT)
Dept: PHYSICAL THERAPY | Age: 88
Setting detail: THERAPIES SERIES
Discharge: HOME | End: 2023-01-18
Payer: MEDICARE

## 2023-01-18 PROCEDURE — 97161 PT EVAL LOW COMPLEX 20 MIN: CPT

## 2023-01-18 PROCEDURE — 97530 THERAPEUTIC ACTIVITIES: CPT

## 2023-01-18 NOTE — FLOWSHEET NOTE
[address] [facility]  Phone: [phone]   Fax: [phone]    Physical Therapy Daily Treatment Note    Date:  2023     Patient Name:  Jil Garcia    :  10/17/1932  MRN: [id number]  Medical Diagnosis:  Diagnosis: R26.81 (ICD-10-CM) - Unsteady gait         Treatment Diagnosis: B lower leg neuropathies, B foot and ankle pain, increased risk for falls, gait abnormalities, BLE weakness     Insurance/Certification information:  PT Insurance Information: Medicare and [employer] - med nec, no CP, no auth  Physician Information:  Sada Rose, *    Plan of care signed (Y/N): []  Yes [x]  No     Date of Patient follow up with Physician:      Progress Report: []  Yes  [x]  No     Date Range for reporting period:  Beginnin2023  Ending:     Progress report due (10 Rx/or 30 days whichever is less): visit #10 or 18      Recertification due (POC duration/ or 90 days whichever is less): visit #12 or 3/18/23 (date)     Visit # Insurance Allowable Auth required?  Date Range    Med nec []  Yes  [x]  No        Latex Allergy:  [x]NO      []YES  Preferred Language for Healthcare:   [x]English       []other:    Functional Scale:       Date assessed:  TUG - 18.78 sec with SPC, 25.21 sec without AD  23    Pain level:   2-3 - currently, 3-4 at worst/10 - B feet and knees (L>R)/10     SUBJECTIVE:  See eval    OBJECTIVE: See eval  *10 meter walk*    RESTRICTIONS/PRECAUTIONS: osteoporosis, arthritis, previous heart attack - 2019, stents, neuropathies up to her knees BLE     Exercises/Interventions:     Therapeutic Exercises (93927) Resistance / level Sets/sec Reps Notes   Nu step       IB  HR  TR       HS stretch        LAQ       Mini squats                                    Therapeutic Activities (37804)       Step ups        STS without UE support        Pt education - see below  X 10 min             Gait (76286)       Amb on gray mats Srinivas                      Neuromuscular Re-ed (54252)    No biodex - visual impairments    airex       Tandem walking       [facility] taps                            Manual Intervention (45685)                                                     Modalities:     Pt. Education:  01/18/2023  -patient educated on diagnosis, prognosis and expectations for rehab  -all patient questions were answered  - educated on neuropathies and POC  - discussed fall risk and safety with using SPC - appropriate height of cane  - provided with HEP for STS and walking     Home Exercise Program:  1/18/23  Access Code: [email or web address]  URL: [email or web address]  Date: 01/18/2023  Prepared by: Irma Regalado     Exercises  Sit to Stand with Armchair - 5 x daily - 7 x weekly - 10 reps  Walking - 5 x daily - 7 x weekly    Therapeutic Exercise and NMR EXR  [] (20922) Provided verbal/tactile cueing for activities related to strengthening, flexibility, endurance, ROM for improvements in  [] LE / Lumbar: LE, proximal hip, and core control with self care, mobility, lifting, ambulation. [] UE / Cervical: cervical, postural, scapular, scapulothoracic and UE control with self care, reaching, carrying, lifting, house/yardwork, driving, computer work.  [] (72060) Provided verbal/tactile cueing for activities related to improving balance, coordination, kinesthetic sense, posture, motor skill, proprioception to assist with   [] LE / lumbar: LE, proximal hip, and core control in self care, mobility, lifting, ambulation and eccentric single leg control.    [] UE / cervical: cervical, scapular, scapulothoracic and UE control with self care, reaching, carrying, lifting, house/yardwork, driving, computer work.   [] (27174) Therapist is in constant attendance of 2 or more patients providing skilled therapy interventions, but not providing any significant amount of measurable one-on-one time to either patient, for improvements in  [] LE / lumbar: LE, proximal hip, and core control in self care, mobility, lifting, ambulation and eccentric single leg control. [] UE / cervical: cervical, scapular, scapulothoracic and UE control with self care, reaching, carrying, lifting, house/yardwork, driving, computer work. NMR and Therapeutic Activities:    [x] (66286 or 63266) Provided verbal/tactile cueing for activities related to improving balance, coordination, kinesthetic sense, posture, motor skill, proprioception and motor activation to allow for proper function of   [x] LE: / Lumbar core, proximal hip and LE with self care and ADLs  [] UE / Cervical: cervical, postural, scapular, scapulothoracic and UE control with self care, carrying, lifting, driving, computer work.   [] (83392) Gait Re-education- Provided training and instruction to the patient for proper LE, core and proximal hip recruitment and positioning and eccentric body weight control with ambulation re-education including up and down stairs     Home Management Training / Self Care:  [] (88769) Provided self-care/home management training related to activities of daily living and compensatory training, and/or use of adaptive equipment for improvement with: ADLs and compensatory training, meal preparation, safety procedures and instruction in use of adaptive equipment, including bathing, grooming, dressing, personal hygiene, basic household cleaning and chores.      Home Exercise Program:    [x] (81779) Reviewed/Progressed HEP activities related to strengthening, flexibility, endurance, ROM of   [] LE / Lumbar: core, proximal hip and LE for functional self-care, mobility, lifting and ambulation/stair navigation   [] UE / Cervical: cervical, postural, scapular, scapulothoracic and UE control with self care, reaching, carrying, lifting, house/yardwork, driving, computer work  [] (85864)Reviewed/Progressed HEP activities related to improving balance, coordination, kinesthetic sense, posture, motor skill, proprioception of [] LE: core, proximal hip and LE for self care, mobility, lifting, and ambulation/stair navigation    [] UE / Cervical: cervical, postural,  scapular, scapulothoracic and UE control with self care, reaching, carrying, lifting, house/yardwork, driving, computer work    Manual Treatments:  PROM / STM / Oscillations-Mobs:  G-I, II, III, IV (PA's, Inf., Post.)  [] (75562) Provided manual therapy to mobilize LE, proximal hip and/or LS spine soft tissue/joints for the purpose of modulating pain, promoting relaxation,  increasing ROM, reducing/eliminating soft tissue swelling/inflammation/restriction, improving soft tissue extensibility and allowing for proper ROM for normal function with   [] LE / lumbar: self care, mobility, lifting and ambulation. [] UE / Cervical: self care, reaching, carrying, lifting, house/yardwork, driving, computer work. Modalities:  [] (66607) Vasopneumatic compression: Utilized vasopneumatic compression to decrease edema / swelling for the purpose of improving mobility and quad tone / recruitment which will allow for increased overall function including but not limited to self-care, transfers, ambulation, and ascending / descending stairs. Charges:  Timed Code Treatment Minutes: 10   Total Treatment Minutes: 40     [x] EVAL - LOW (91171)   [] EVAL - MOD (16033)  [] EVAL - HIGH (63682)  [] RE-EVAL (69499)  [] GL(66392) x       [] Ionto  [] NMR (89926) x       [] Vaso  [] Manual (32387) x       [] Ultrasound  [x] TA x  1      [] Mech Traction (91544)  [] Aquatic Therapy x     [] ES (un) (04104):   [] Home Management Training x  [] ES(attended) (34417)   [] Dry Needling 1-2 muscles (58649):  [] Dry Needling 3+ muscles (605397)  [] Group:      [] Other:     GOALS:   Patient stated goal: amb without cane, home management   [] Progressing: [] Met: [] Not Met: [] Adjusted     Therapist goals for Patient:   Short Term Goals: To be achieved in: 2 weeks  1.  Independent in HEP and progression per patient tolerance, in order to prevent re-injury. [] Progressing: [] Met: [] Not Met: [] Adjusted  2. Patient will have a decrease in pain to facilitate improvement in movement, function, and ADLs as indicated by improvement with respect to Functional Deficits. [] Progressing: [] Met: [] Not Met: [] Adjusted     Long Term Goals: To be achieved in: 6 weeks  1. TUG of <12.0 sec with LRAD to demonstrate decreased risk for falls. [] Progressing: [] Met: [] Not Met: [] Adjusted  2. Patient will demonstrate increased B knee ext AROM  to 0 deg knee ext, to allow for proper joint functioning to allow pt to resume ambulation without increase in symptoms. [] Progressing: [] Met: [] Not Met: [] Adjusted  3. Patient will demonstrate increased BLE Strength to at least 4+/5 to allow for proper functional mobility as indicated by patients Functional Deficits to allow pt to resume home management without increase in symptoms. [] Progressing: [] Met: [] Not Met: [] Adjusted  4. Patient will return to functional activities including home management without increased symptoms or restriction. [] Progressing: [] Met: [] Not Met: [] Adjusted  5. Pt will complete 9 STS in 30 sec to demonstrate improved BLE strength. [] Progressing: [] Met: [] Not Met: [] Adjusted    Overall Progression Towards Functional goals/ Treatment Progress Update:  [] Patient is progressing as expected towards functional goals listed. [] Progression is slowed due to complexities/Impairments listed. [] Progression has been slowed due to co-morbidities.   [x] Plan just implemented, too soon to assess goals progression <30days   [] Goals require adjustment due to lack of progress  [] Patient is not progressing as expected and requires additional follow up with physician  [] Other    Persisting Functional Limitations/Impairments:  [x]Sleeping []Sitting               [x]Standing []Transfers        [x]Walking []Kneeling               [x]Stairs [x]Squatting / bending   [x]ADLs []Reaching  [x]Lifting  [x]Housework  []Driving []Job related tasks  []Sports/Recreation []Other:        ASSESSMENT:  See eval    Treatment/Activity Tolerance:  [x] Patient able to complete tx [] Patient limited by fatigue  [] Patient limited by pain  [] Patient limited by other medical complications  [] Other:     Prognosis: [x] Good [] Fair  [] Poor    Patient Requires Follow-up: [x] Yes  [] No    Plan for next treatment session: see flowsheet    PLAN: See eval. PT 2x / week for 6 weeks. [] Continue per plan of care [] Alter current plan (see comments)  [x] Plan of care initiated [] Hold pending MD visit [] Discharge    Electronically signed by: Stephy Gaitan PT, DPT    Note: If patient does not return for scheduled/ recommended follow up visits, this note will serve as a discharge from care along with most recent update on progress.

## 2023-01-18 NOTE — PLAN OF CARE
[address] [address], [facility] [address]  Phone: [phone]   Fax: [phone]                                                       Candipantera Michael    Dear Dr. Monserrat Lee DPM,    We had the pleasure of evaluating the following patient for physical therapy services at [address] [place] [address]. A summary of our findings can be found in the initial assessment below. This includes our plan of care. If you have any questions or concerns regarding these findings, please do not hesitate to contact me at the office phone number checked above. Thank you for the referral.       Physician Signature:_______________________________Date:__________________  By signing above (or electronic signature), therapist's plan is approved by physician      Patient: Day[facility]   : 10/17/1932   MRN: [id number]  Referring Physician: Bekah Castillo, *        Evaluation Date: 2023      Medical Diagnosis Information:  Diagnosis: R26.81 (ICD-10-CM) - Unsteady gait    Treatment diagnosis - B lower leg neuropathies, B foot and ankle pain, increased risk for falls, gait abnormalities, BLE weakness                                          Insurance information: PT Insurance Information: Medicare and [employer] - med nec, no CP, no auth      Preferred Language for Healthcare:   [x]English       []Other:    C-SSRS Triggered by Intake questionnaire (Past 2 wk assessment ):   [x] No, Questionnaire did not trigger screening.   [] Yes, Patient intake triggered C-SSRS Screening     [] Completed, no further action required. [] Completed, PCP notified via Epic    SUBJECTIVE: Pt reports she has neuropathy and arthritis in B feet. Reports she cannot take arthritis medication due to previous heart attacks. Has a compounded cream without arthritis ingredient but states it hasn't helped much. Reports no recent falls - uses SPC all of the time. Reports she is very fearful of falling. Currently drives short distances. Reports improvements in pain and sleeping since using the cream.    ONSET: 1.5 yrs     Current Level of Function: amb without AD, home management, hasn't cooked much since April when her  passed, sits to clean feet in the shower - stands for the rest of her shower  Prior Level of Function: Prior to this injury / incident, pt was independent with ADLs and IADLs    Living Status: lives alone - good support from her daughter and son in law - Nicho Barney and [place]ela [place] (lives a block away and helps with cooking)  Occupation/School: used to enjoy reading but her eyes are bad now     PAIN:  Pain Scale: 2-3 - currently, 3-4 at worst/10 - B feet and knees (L>R)  Easing factors: sitting, NWB   Provocative factors: weight bearing, walking    Functional Outcome: TUG - 18.78 sec with SPC, 25.21 sec without AD     Precautions/ Contra-indications: osteoporosis, arthritis, previous heart attack - Nov 2019, stents, neuropathies up to her knees BLE   Latex Allergy:  [x]NO      []YES  Relevant Medical History:    [x] Patient history, allergies, meds reviewed. Medical chart reviewed. See intake form. Review Of Systems (ROS):  [x]Performed Review of systems (Integumentary, CardioPulmonary, Neurological) by intake and observation. Intake form has been scanned into medical record. Patient has been instructed to contact their primary care physician regarding ROS issues if not already being addressed at this time.       Co-morbidities/Complexities (which will affect course of rehabilitation):  []None        []Hx of COVID   Arthritic conditions   []Rheumatoid arthritis (M05.9)  [x]Osteoarthritis (M19.91)  []Gout   Cardiovascular conditions   []Hypertension (I10)  []Hyperlipidemia (E78.5)  []Angina pectoris (I20)  []Atherosclerosis (I70)  []Pacemaker  [x]Hx of CABG/stent/  cardiac surgeries   Musculoskeletal conditions   []Disc pathology   []Congenital spine pathologies   [x]Osteoporosis (M81.8)  []Osteopenia (M85.8)  []Scoliosis       Endocrine conditions   []Hypothyroid (E03.9)  []Hyperthyroid Gastrointestinal conditions   []Constipation (F36.25)   Metabolic conditions   []Morbid obesity (E66.01)  []Diabetes type 1(E10.65) or 2 (E11.65)   []Neuropathy (G60.9)     Cardio/Pulmonary conditions   []Asthma (J45)  []Coughing   []COPD (J44.9)  []CHF  []A-fib   Psychological Disorders  []Anxiety (F41.9)  []Depression (F32.9)   []Other:   Developmental Disorders  []Autism (F84.0)  []CP (G80)  []Down Syndrome (Q90.9)  []Developmental delay     Neurological conditions  []Prior Stroke (I69.30)  []Parkinson's (G20)  []Encephalopathy (G93.40)  []MS (G35)  []Post-polio (G14)  []SCI  []TBI  []ALS Other conditions  []Fibromyalgia (M79.7)  []Vertigo  []Syncope  []Kidney Failure  []Cancer      []currently undergoing                treatment  []Pregnancy  []Incontinence   Prior surgeries  []involved limb  []previous spinal surgery  [] section birth  []hysterectomy  []bowel / bladder surgery  []other relevant surgeries   []Other:                OBJECTIVE:     Gait: Amb with SPC in L hand - decreased asad, decreased L knee flexion, increased time to initiate amb after STS   Amb without AD - decreased asad, decreased L step length, decreased L knee flexion    Balance: TUG - 18.78 sec with SPC, 25.21 sec without AD       Dermatomes Normal Abnormal Comments   anterior mid-thigh (L2) x     distal ant thigh/med knee (L3) x     medial lower leg and foot (L4)  X - tingly B B neuropathies    lateral lower leg and foot (L5)  X - tingly B    posterior calf (S1)  X - tingly B    medial calcaneus (S2)  X - tingly B        Strength / Myotomes RIGHT LEFT Comments   LE      Hip Flexors (L1-2) 3+ 3+    Hip Abductors 4- 4- seated   Quads (L2-4) 3- unable to achieve full knee ext  3- unable to achieve full knee ext     Hamstrings  4 4-    Ankle Dorsiflexion (L4-5) 4+ 4    Ankle Inversion 4+ 4 Ankle Eversion (S1-2) 4+ 4+      Lacking 8 deg L knee ext - PROM   Lacking 6 deg R knee ext - PROM     30 sec STS - 5 from standard chair without UE support            Barriers to/and or personal factors that will affect rehab potential:              [x]Age  []Sex    []Smoker              [x]Motivation                  []Co-Morbidities              []Cognitive Function, education/learning barriers              []Environmental, home barriers              []profession/work barriers  [x]past PT/medical experience  []other:      Falls Risk Assessment (30 days):   [] Falls Risk assessed and no intervention required. [x] Falls Risk assessed and Patient requires intervention due to being higher risk   TUG score (>12s at risk):     [x] Falls education provided, including continuing to amb with SPC. Resting when fatigued. ASSESSMENT: Pt is a 79 y/o female who presents with B lower leg neuropathies, B foot pain, and B knee pain. Pt with B LE weakness, balance impairments, and gait deviations. Pt is limited in home management and ADLs including bathing and dressing due to above limitations. Pt will benefit from OP PT to improve strength and balance to decrease risk for falls and improve functional mobility.      Functional Impairments:  Lumbar/lower quarter:     []Noted lumbar/proximal hip hypomobility   []Noted lumbosacral and/or generalized hypermobility   []Decreased Lumbosacral/hip/LE functional ROM   [x]Decreased core/proximal hip strength and neuromuscular control    [x]Decreased LE functional strength    []Abnormal reflexes/sensation/myotomal/dermatomal deficits  []Reduced ability to run, hop, cut or jump  [x]Reduced balance/proprioceptive control    []other:  reduced functional ROM of    []other:  reduce functional strength of    []other: myofascial changes and pain at    [] Postural impairments:   []other:      Functional Activity Limitations (from functional questionnaire and intake)   []Reduced ability to tolerate prolonged functional positions   []Reduced ability or difficulty with changes of positions or transfers between positions   []Reduced ability to maintain good posture and demonstrate good body mechanics with sitting, bending, and lifting   [x]Reduced ability to sleep   [] Reduced ability or tolerance with driving and/or computer work   [x]Reduced ability to perform lifting, reaching, carrying tasks   [x]Reduced ability to squat   []Reduced ability to forward bend   [x]Reduced ability to ambulate prolonged functional periods/distances/surfaces   [x]Reduced ability to ascend/descend stairs   []Reduced ability to concentrate    []Reduced ability to tolerate any impact through UE or spine   []other:     Participation Restrictions   [x]Reduced participation in self care activities   [x]Reduced participation in home management activities   []Reduced participation in work activities   []Reduced participation in social activities. []Reduced participation in sport/recreational activities. Classification:  Lumbar/Lower quarter:   []Signs/symptoms consistent with Lumbar instability/stabilization subgroup. []Signs/symptoms consistent with Lumbar mobilization/manipulation subgroup, myotomes and dermatomes intact. Meets manipulation criteria. []Signs/symptoms consistent with Lumbar direction specific/centralization subgroup   []Signs/symptoms consistent with Lumbar traction subgroup     []Signs/symptoms consistent with lumbar facet dysfunction   []Signs/symptoms consistent with lumbar stenosis type dysfunction   []Signs/symptoms consistent with nerve root involvement including myotome & dermatome dysfunction   []Signs/symptoms consistent with post-surgical status including: decreased ROM, strength and function.    []signs/symptoms consistent with pathology which may benefit from Dry needling    []Signs/symptoms consistent with joint sprain/strain  []Signs/symptoms consistent with patella-femoral syndrome   []Signs/symptoms consistent with knee OA/hip OA   []Signs/symptoms consistent with internal derangement of knee/Hip   []Signs/symptoms consistent with functional hip weakness/NMR control      []Signs/symptoms consistent with tendinitis/tendinosis    []signs/symptoms consistent with pathology which may benefit from Dry needling   [x]other: increased risk for falls   \    Prognosis/Rehab Potential:      []Excellent   [x]Good    []Fair   []Poor    Tolerance of evaluation/treatment:    []Excellent   [x]Good    []Fair   []Poor     Physical Therapy Evaluation Complexity Justification  [x] A history of present problem with:  [] no personal factors and/or comorbidities that impact the plan of care;  [x]1-2 personal factors and/or comorbidities that impact the plan of care  []3 personal factors and/or comorbidities that impact the plan of care  [x] An examination of body systems using standardized tests and measures addressing any of the following: body structures and functions (impairments), activity limitations, and/or participation restrictions:  [x] a total of 1-2 or more elements   [] a total of 3 or more elements   [] a total of 4 or more elements   [x] A clinical presentation with:  [x] stable and/or uncomplicated characteristics   [] evolving clinical presentation with changing characteristics  [] unstable and unpredictable characteristics;   [x] Clinical decision making of [x] low, [] moderate, [] high complexity using standardized patient assessment instrument and/or measurable assessment of functional outcome.    [x] EVAL (LOW) 26268 (typically 15 minutes face-to-face)  [] EVAL (MOD) 35533 (typically 30 minutes face-to-face)  [] EVAL (HIGH) 55963 (typically 45 minutes face-to-face)  [] RE-EVAL     HEP instruction: Written HEP instructions provided and reviewed  Access Code: [email or web address]  URL: [email or web address]  Date: 01/18/2023  Prepared by: Dixie Sanchez    Exercises  Sit to Stand with Armchair -  5 x daily - 7 x weekly - 10 reps  Walking - 5 x daily - 7 x weekly      PLAN:  strength, ROM/flexibility, posture and body mechs, balance, gait, HEP, pt education    Frequency/Duration:  2 days per week for 6 Weeks:  Interventions:  [x]  Therapeutic exercise including:strength, ROM, flexibility  [x]  NMR activation and proprioception including postural re-education  [x]  Manual therapy as indicated to include: IASTM, STM, PROM, Gr I-IV mobilizations, manipulation.   [x]  Modalities as needed that may include: thermal agents, E-stim, Biofeedback, US, iontophoresis as indicated  [x]  Patient education on joint protection, postural re-education, activity modification, progression of HEP.  Aquatic therapy    GOALS:  Patient stated goal: amb without cane, home management   [] Progressing: [] Met: [] Not Met: [] Adjusted    Therapist goals for Patient:   Short Term Goals: To be achieved in: 2 weeks  1. Independent in HEP and progression per patient tolerance, in order to prevent re-injury.   [] Progressing: [] Met: [] Not Met: [] Adjusted  2. Patient will have a decrease in pain to facilitate improvement in movement, function, and ADLs as indicated by improvement with respect to Functional Deficits.  [] Progressing: [] Met: [] Not Met: [] Adjusted    Long Term Goals: To be achieved in: 6 weeks  1. TUG of <12.0 sec with LRAD to demonstrate decreased risk for falls.   [] Progressing: [] Met: [] Not Met: [] Adjusted  2. Patient will demonstrate increased B knee ext AROM  to 0 deg knee ext, to allow for proper joint functioning to allow pt to resume ambulation without increase in symptoms.   [] Progressing: [] Met: [] Not Met: [] Adjusted  3. Patient will demonstrate increased BLE Strength to at least 4+/5 to allow for proper functional mobility as indicated by patients Functional Deficits to allow pt to resume home management without increase in symptoms.    [] Progressing: [] Met: [] Not Met: [] Adjusted  4. Patient will  return to functional activities including home management without increased symptoms or restriction. [] Progressing: [] Met: [] Not Met: [] Adjusted  5. Pt will complete 9 STS in 30 sec to demonstrate improved BLE strength.    [] Progressing: [] Met: [] Not Met: [] Adjusted    Electronically signed by:  Yosef Muhammad, PT, DPT

## 2023-01-24 ENCOUNTER — HOSPITAL ENCOUNTER (OUTPATIENT)
Dept: PHYSICAL THERAPY | Age: 88
Setting detail: THERAPIES SERIES
Discharge: HOME | End: 2023-01-24
Payer: MEDICARE

## 2023-01-24 NOTE — FLOWSHEET NOTE
[address]     Physical Therapy  Cancellation/No-show Note  Patient Name:  Trena Church  :  10/17/1932   Date:  2023  Cancelled visits to date: 1  No-shows to date: 0    Patient status for today's appointment patient:  [x]  Cancelled -   []  Rescheduled appointment  []  No-show     Reason given by patient:  []  Patient ill  []  Conflicting appointment  []  No transportation    []  Conflict with work  []  No reason given  [x]  Other:     Comments:  pt's son in law taken to the ER     Phone call information:   []  Phone call made today to patient at _ time at number provided:      []  Patient answered, conversation as follows:    []  Patient did not answer, message left as follows:  []  Phone call not made today  [x]  Phone call not needed - pt contacted us to cancel and provided reason for cancellation.      Electronically signed by:  Doreen Leo, PT, DPT

## 2023-01-27 ENCOUNTER — HOSPITAL ENCOUNTER (OUTPATIENT)
Dept: PHYSICAL THERAPY | Age: 88
Setting detail: THERAPIES SERIES
Discharge: HOME | End: 2023-01-27
Payer: MEDICARE

## 2023-01-27 NOTE — FLOWSHEET NOTE
[address] [employer]     Physical Therapy  Cancellation/No-show Note  Patient Name:  Roxann Marie  :  10/17/1932   Date:  2023  Cancelled visits to date: 2  No-shows to date: 0    Patient status for today's appointment patient:  [x]  Cancelled - ,   []  Rescheduled appointment  []  No-show     Reason given by patient:  []  Patient ill  []  Conflicting appointment  []  No transportation    []  Conflict with work  []  No reason given  [x]  Other:     Comments:  Pt reports increased LBP with STS. States she followed the directions on the HEP paper and was flexing fwd to about her knees prior to standing. States she called referring practitioner he advised she stop therapy until they figure out what is wrong with her back. Pt with history of chronic LBP. Educated pt when she completes STS to not focus on lumbar flexion and to stand up as she typically would and to continue with walking. Pt reports understanding and requests remaining visits be cancelled. Advised pt to contact PT with questions or need for therapy in the future. Phone call information:   [x]  Phone call made today to patient at 1:50 time at number provided:      [x]  Patient answered, conversation as follows: pt called to cancel visit due to LBP and requested PT to call her when able. See above. []  Patient did not answer, message left as follows:  []  Phone call not made today  []  Phone call not needed - pt contacted us to cancel and provided reason for cancellation.      Electronically signed by:  Nae Acosta, PT, DPT

## 2023-01-31 ENCOUNTER — APPOINTMENT (OUTPATIENT)
Dept: PHYSICAL THERAPY | Age: 88
End: 2023-01-31
Payer: MEDICARE

## 2023-03-09 RX ORDER — ROSUVASTATIN CALCIUM 5 MG/1
TABLET, COATED ORAL
Qty: 45 TABLET | Refills: 3 | Status: SHIPPED | OUTPATIENT
Start: 2023-03-09

## 2023-03-09 NOTE — TELEPHONE ENCOUNTER
Last OV: 7/8/22 [facility]  Last Labs: 2/20/2020 ast,alt,lipid  Last refill: 6/10/22  Next appt:  7/21/23 psc

## 2023-04-05 DIAGNOSIS — I50.22 CHRONIC SYSTOLIC HEART FAILURE (HCC): ICD-10-CM

## 2023-04-05 DIAGNOSIS — E78.2 MIXED HYPERLIPIDEMIA: Primary | ICD-10-CM

## 2023-04-05 RX ORDER — LOSARTAN POTASSIUM 25 MG/1
12.5 TABLET ORAL DAILY
Qty: 45 TABLET | Refills: 3 | Status: SHIPPED | OUTPATIENT
Start: 2023-04-05

## 2023-04-05 NOTE — TELEPHONE ENCOUNTER
Reviewed with Jean Marie Whaley. Ms. Shaina Gomez needs updated labs. Called her, she will complete this week at PCP. Refill sent.

## 2023-04-05 NOTE — TELEPHONE ENCOUNTER
Medication Refill    Medication needing refilled:  losartan (COZAAR) 25 MG tabl    Dosage of the medication:      How are you taking this medication (QD, BID, TID, QID, PRN):  Take 0.5 tablets by mouth daily    30 or 90 day supply called in:  90 days with refills    When will you run out of your medication:    Which Pharmacy are we sending the medication to?:  [facility] [address] [place] [address] 350, 000 [facility] [phone]

## 2023-06-08 RX ORDER — ROSUVASTATIN CALCIUM 5 MG/1
5 TABLET, COATED ORAL DAILY
Qty: 90 TABLET | Refills: 3 | Status: SHIPPED | OUTPATIENT
Start: 2023-06-08

## 2023-06-08 NOTE — TELEPHONE ENCOUNTER
Medication Refill    Medication needing refilled:  rosuvastatin (CRESTOR) 5 MG    Dosage of the medication:    How are you taking this medication (QD, BID, TID, QID, PRN): 1 tab every other day    30 or 90 day supply called in: 90 day supply    When will you run out of your medication:    Which Pharmacy are we sending the medication to?:[id number] [address], [facility] [address] - P [phone] - F [phone]

## 2023-08-21 ENCOUNTER — TELEPHONE (OUTPATIENT)
Dept: CARDIOLOGY CLINIC | Age: 88
End: 2023-08-21

## 2023-08-21 NOTE — TELEPHONE ENCOUNTER
Pt called to r/s her appt as she is not feeling well. Her appt was on 08/24. Pt would like to be schedule out 3 weeks at [facility]. Please give date and time.   Thank you

## 2023-09-17 PROBLEM — I10 HTN (HYPERTENSION): Status: ACTIVE | Noted: 2023-09-17

## 2023-09-17 NOTE — PROGRESS NOTES
CALTRATE 600+D PLUS MINERALS (CALTRATE) 600-800 MG-UNIT TABS tablet Take 2 tablets by mouth daily      aspirin 81 MG EC tablet Take 1 tablet by mouth daily 30 tablet 3    levothyroxine (SYNTHROID) 75 MCG tablet Take 1 tablet by mouth Daily      fexofenadine (ALLEGRA ALLERGY) 60 MG tablet Take 1 tablet by mouth daily       No current facility-administered medications for this visit. Allergies:  Propoxyphene and Adhesive tape     Social History:  Social History     Socioeconomic History    Marital status:      Spouse name: Not on file    Number of children: Not on file    Years of education: Not on file    Highest education level: Not on file   Occupational History    Not on file   Tobacco Use    Smoking status: Former    Smokeless tobacco: Never   Vaping Use    Vaping Use: Never used   Substance and Sexual Activity    Alcohol use: Not Currently    Drug use: Never    Sexual activity: Not Currently   Other Topics Concern    Not on file   Social History Narrative    Not on file     Social Determinants of Health     Financial Resource Strain: Not on file   Food Insecurity: Not on file   Transportation Needs: Not on file   Physical Activity: Not on file   Stress: Not on file   Social Connections: Not on file   Intimate Partner Violence: Not on file   Housing Stability: Not on file       Family History:   History reviewed. No pertinent family history. Family history has been reviewed and not pertinent except as noted above. Review of Systems:   Constitutional: there has been no unanticipated weight loss. No change in energy or activity level   Eyes: No visual changes   ENT: No Headaches, hearing loss or vertigo. No mouth sores or sore throat. Cardiovascular: Reviewed in HPI  Respiratory: No cough or wheezing, no sputum production. Gastrointestinal: No abdominal pain, appetite loss, blood in stools. No change in bowel or bladder habits.   Genitourinary: No nocturia, dysuria, trouble

## 2023-09-18 ENCOUNTER — OFFICE VISIT (OUTPATIENT)
Dept: CARDIOLOGY CLINIC | Age: 88
End: 2023-09-18
Payer: MEDICARE

## 2023-09-18 VITALS
SYSTOLIC BLOOD PRESSURE: 134 MMHG | WEIGHT: 119 LBS | DIASTOLIC BLOOD PRESSURE: 60 MMHG | HEART RATE: 84 BPM | HEIGHT: 60 IN | OXYGEN SATURATION: 96 % | BODY MASS INDEX: 23.36 KG/M2

## 2023-09-18 DIAGNOSIS — I25.5 CARDIOMYOPATHY, ISCHEMIC: ICD-10-CM

## 2023-09-18 DIAGNOSIS — I25.10 CORONARY ARTERY DISEASE INVOLVING NATIVE CORONARY ARTERY OF NATIVE HEART WITHOUT ANGINA PECTORIS: Primary | ICD-10-CM

## 2023-09-18 DIAGNOSIS — E78.2 MIXED HYPERLIPIDEMIA: ICD-10-CM

## 2023-09-18 DIAGNOSIS — I10 HYPERTENSION, UNSPECIFIED TYPE: ICD-10-CM

## 2023-09-18 PROCEDURE — 1123F ACP DISCUSS/DSCN MKR DOCD: CPT | Performed by: INTERNAL MEDICINE

## 2023-09-18 PROCEDURE — 99214 OFFICE O/P EST MOD 30 MIN: CPT | Performed by: INTERNAL MEDICINE

## 2023-09-18 PROCEDURE — G8427 DOCREV CUR MEDS BY ELIG CLIN: HCPCS | Performed by: INTERNAL MEDICINE

## 2023-09-18 PROCEDURE — 1036F TOBACCO NON-USER: CPT | Performed by: INTERNAL MEDICINE

## 2023-09-18 PROCEDURE — 93000 ELECTROCARDIOGRAM COMPLETE: CPT | Performed by: INTERNAL MEDICINE

## 2023-09-18 PROCEDURE — G8420 CALC BMI NORM PARAMETERS: HCPCS | Performed by: INTERNAL MEDICINE

## 2023-09-18 PROCEDURE — 1090F PRES/ABSN URINE INCON ASSESS: CPT | Performed by: INTERNAL MEDICINE

## 2024-02-13 ENCOUNTER — TELEPHONE (OUTPATIENT)
Dept: CARDIOLOGY CLINIC | Age: 89
End: 2024-02-13

## 2024-02-13 DIAGNOSIS — I50.22 CHRONIC SYSTOLIC HEART FAILURE (HCC): ICD-10-CM

## 2024-02-13 RX ORDER — LOSARTAN POTASSIUM 25 MG/1
12.5 TABLET ORAL DAILY
Qty: 45 TABLET | Refills: 3 | Status: SHIPPED | OUTPATIENT
Start: 2024-02-13

## 2024-02-13 NOTE — TELEPHONE ENCOUNTER
Pt called wanting to know if they can go off of the rosuvastatin? Pt stated she heard to many bad things about it.    Pls advise thank you

## 2024-02-13 NOTE — TELEPHONE ENCOUNTER
I spoke with her and she is not having having any all over muscle weakness or muscle aches. She states her legs do hurt occasionally. She has been taking for 4 years. After taking to her she said she will cont to take she was just concerned about some of the things she has heard.

## 2024-02-13 NOTE — TELEPHONE ENCOUNTER
9/18/2024 Ov with [facility]    Next OV due in a year    I spoke with pt and she has had labs done with her PCP. I called her PCP and requested her most recent labs to be faxed to our office.

## 2024-02-13 NOTE — TELEPHONE ENCOUNTER
Medication Refill    Medication needing refilled:  losartan (COZAAR) 25 MG tablet   Dosage of the medication:  12.5 mg  How are you taking this medication (QD, BID, TID, QID, PRN):  Take .05 tablets by mouth daily   30 or 90 day supply called in:    Which Pharmacy are we sending the medication to?:   [id number] - [place], [place] - [address] - P [phone] - F [phone] [address] [place] [address]  Phone: [phone]  Fax: [phone]

## 2024-02-14 NOTE — TELEPHONE ENCOUNTER
Called pt to inform her of the message below. She stated she had received a phone call that stated she need to continue taking rosuvastatin. I informed her as far as I can see the latest message was if she wants to stop taking the rosuvastatin it ok per [facility]. She said that she will stop taking it.

## 2024-04-09 DIAGNOSIS — I50.22 CHRONIC SYSTOLIC HEART FAILURE (HCC): ICD-10-CM

## 2024-04-09 RX ORDER — LOSARTAN POTASSIUM 25 MG/1
12.5 TABLET ORAL DAILY
Qty: 45 TABLET | Refills: 3 | OUTPATIENT
Start: 2024-04-09

## 2024-04-29 ENCOUNTER — TELEPHONE (OUTPATIENT)
Dept: CARDIOLOGY CLINIC | Age: 89
End: 2024-04-29

## 2024-04-29 NOTE — TELEPHONE ENCOUNTER
I spoke to pt and let her know that the Crestor was for her Cholesterol and not for B/P. She is taking 12.5 mg of Losartan daily. Today her B/P was 140/78. She said her PCP did not making any changes the day she was there. She has her son's B/P machine and will start taking her B/P everyday and call us with the results next week.

## 2024-04-29 NOTE — TELEPHONE ENCOUNTER
Pt states her BP was 173/she does not remember the diastolic at her PCP.  She would like to know if stopping rosuvastatin could be the reason it is going up.  If it is, she would like to know if she can start taking rosuvastatin again, 5 MG 1 every other day.    Please advise.

## 2024-05-02 RX ORDER — ROSUVASTATIN CALCIUM 5 MG/1
5 TABLET, COATED ORAL DAILY
Qty: 90 TABLET | Refills: 3 | Status: SHIPPED | OUTPATIENT
Start: 2024-05-02

## 2024-05-02 NOTE — TELEPHONE ENCOUNTER
Pt states her pcp checked her cholesterol, and it is high.  She is requesting to start rosuvastatin (CRESTOR) again.  She disposed of the supply she had, so she will need a refill.    Medication Refill    Medication needing refilled:  rosuvastatin (CRESTOR)   Dosage of the medication:  5 MG tablet   How are you taking this medication (QD, BID, TID, QID, PRN):  Take 1 tablet by mouth every other day   30 or 90 day supply called in:  90 day supply  When will you run out of your medication:  Pt is out  Which Pharmacy are we sending the medication to?:  [facility] #[id number] [address] - P [phone] - F [phone]

## 2024-05-14 NOTE — TELEPHONE ENCOUNTER
4/29/24  140/78  4/30/24  138/64    5/1/24   138/68  5/2/24   134/78    5/3/24  148/62  5/4/24  148/88    5/5/24  146/85  5/6/24  152/86  5/7/24  152/87  5/8/24   145/82  5/9/24   142/82    5/10/24 152/75  5/11/24  140/82    5/12 /24 128/57    Pt states she ate a whole lot of brownies during these BP readings.

## 2024-05-15 DIAGNOSIS — I50.22 CHRONIC SYSTOLIC HEART FAILURE (HCC): ICD-10-CM

## 2024-05-15 RX ORDER — LOSARTAN POTASSIUM 25 MG/1
25 TABLET ORAL DAILY
Qty: 90 TABLET | Refills: 3 | Status: SHIPPED | OUTPATIENT
Start: 2024-05-15

## 2024-05-15 NOTE — TELEPHONE ENCOUNTER
Reviewed with [facility]. Increase losartan to 25mg. Called and updated Faina, maria elena v/u. Refill sent to preferred pharmacy.

## 2024-09-23 NOTE — PROGRESS NOTES
[facility]  [phone]        Patient: Faina Perez  YOB: 1932         Chief Complaint   Patient presents with    1 Year Follow Up     No c/c        Referring provider: Justin Cristobal MD    History of Present Illness:  Faina Perez is an 88 y.o. is here for routine follow up    Today she is here with her daughter with use of a wheelchair down to the office today. She reports her PCP told her she is anemic a couple weeks ago and has started iron for that which has been helpful for her energy. She enjoys staying busy and tries to remain as active as possible. She Denies chest pain/pressure/squeezing/tightness, dizziness/lightheadedness, shortness of breath/dyspnea on exertion.      With regard to medication therapy he/she has been compliant with prescribed regimen and has tolerated therapy to date.    Past Medical History:   has a past medical history of Acute systolic (congestive) heart failure (HCC), Arthritis, Coronary artery disease involving native coronary artery of native heart without angina pectoris, HTN (hypertension), Mixed hyperlipidemia, and Thyroid disease.    Surgical History:   has a past surgical history that includes back surgery; Hysterectomy; and Appendectomy.     Current Outpatient Medications   Medication Sig Dispense Refill    ferrous sulfate (IRON 325) 325 (65 Fe) MG tablet Take 1 tablet by mouth daily (with breakfast)      NONFORMULARY Absorbine pro      aspirin 81 MG EC tablet Take 1 tablet by mouth daily 90 tablet 3    losartan (COZAAR) 25 MG tablet Take 1 tablet by mouth daily 90 tablet 3    rosuvastatin (CRESTOR) 5 MG tablet Take 1 tablet by mouth daily 90 tablet 3    traMADol (ULTRAM) 50 MG tablet TK 1 T PO BID      coenzyme Q10 100 MG CAPS capsule Take 1 capsule by mouth daily 90 capsule 3    vitamin C (ASCORBIC ACID) 500 MG tablet Take 1 tablet by mouth daily      Multiple Vitamins-Minerals (PRESERVISION AREDS 2+MULTI VIT) CAPS Take 2 capsules

## 2024-09-23 NOTE — PATIENT INSTRUCTIONS
Update blood work  Can consider stopping losartan if your blood pressure continues to be low or if you are dizzy/lightheaded  Refills sent on all medications

## 2024-10-02 ENCOUNTER — OFFICE VISIT (OUTPATIENT)
Dept: CARDIOLOGY CLINIC | Age: 89
End: 2024-10-02
Payer: MEDICARE

## 2024-10-02 VITALS
HEART RATE: 77 BPM | HEIGHT: 60 IN | SYSTOLIC BLOOD PRESSURE: 112 MMHG | OXYGEN SATURATION: 96 % | WEIGHT: 129 LBS | BODY MASS INDEX: 25.32 KG/M2 | DIASTOLIC BLOOD PRESSURE: 58 MMHG

## 2024-10-02 DIAGNOSIS — I50.22 CHRONIC SYSTOLIC HEART FAILURE (HCC): ICD-10-CM

## 2024-10-02 DIAGNOSIS — I25.5 CARDIOMYOPATHY, ISCHEMIC: ICD-10-CM

## 2024-10-02 DIAGNOSIS — E78.2 MIXED HYPERLIPIDEMIA: ICD-10-CM

## 2024-10-02 DIAGNOSIS — I25.10 CORONARY ARTERY DISEASE INVOLVING NATIVE CORONARY ARTERY OF NATIVE HEART WITHOUT ANGINA PECTORIS: Primary | ICD-10-CM

## 2024-10-02 DIAGNOSIS — I10 HYPERTENSION, UNSPECIFIED TYPE: ICD-10-CM

## 2024-10-02 PROCEDURE — 99214 OFFICE O/P EST MOD 30 MIN: CPT | Performed by: INTERNAL MEDICINE

## 2024-10-02 PROCEDURE — 1036F TOBACCO NON-USER: CPT | Performed by: INTERNAL MEDICINE

## 2024-10-02 PROCEDURE — 1090F PRES/ABSN URINE INCON ASSESS: CPT | Performed by: INTERNAL MEDICINE

## 2024-10-02 PROCEDURE — G8427 DOCREV CUR MEDS BY ELIG CLIN: HCPCS | Performed by: INTERNAL MEDICINE

## 2024-10-02 PROCEDURE — G8484 FLU IMMUNIZE NO ADMIN: HCPCS | Performed by: INTERNAL MEDICINE

## 2024-10-02 PROCEDURE — G8419 CALC BMI OUT NRM PARAM NOF/U: HCPCS | Performed by: INTERNAL MEDICINE

## 2024-10-02 PROCEDURE — 1123F ACP DISCUSS/DSCN MKR DOCD: CPT | Performed by: INTERNAL MEDICINE

## 2024-10-02 RX ORDER — ASPIRIN 81 MG/1
81 TABLET ORAL DAILY
Qty: 90 TABLET | Refills: 3 | Status: SHIPPED | OUTPATIENT
Start: 2024-10-02

## 2024-10-02 RX ORDER — LOSARTAN POTASSIUM 25 MG/1
25 TABLET ORAL DAILY
Qty: 90 TABLET | Refills: 3 | Status: SHIPPED | OUTPATIENT
Start: 2024-10-02

## 2024-10-02 RX ORDER — FERROUS SULFATE 325(65) MG
325 TABLET ORAL
COMMUNITY

## 2024-10-02 RX ORDER — ROSUVASTATIN CALCIUM 5 MG/1
5 TABLET, COATED ORAL DAILY
Qty: 90 TABLET | Refills: 3 | Status: SHIPPED | OUTPATIENT
Start: 2024-10-02